# Patient Record
Sex: MALE | Race: WHITE | NOT HISPANIC OR LATINO | Employment: FULL TIME | URBAN - METROPOLITAN AREA
[De-identification: names, ages, dates, MRNs, and addresses within clinical notes are randomized per-mention and may not be internally consistent; named-entity substitution may affect disease eponyms.]

---

## 2017-01-03 ENCOUNTER — TELEPHONE (OUTPATIENT)
Dept: ORTHOPEDICS | Facility: CLINIC | Age: 22
End: 2017-01-03

## 2017-01-03 NOTE — TELEPHONE ENCOUNTER
INFORMED PT'S MOTHER THAT MEDICAID IS NOT ACCEPTED BY [facility].  PT HAS NOT BEEN SEEN AT [facility] SINCE 2013.

## 2017-01-03 NOTE — TELEPHONE ENCOUNTER
----- Message from Cary Gao sent at 1/3/2017  3:52 PM CST -----  Contact: pt mother christine  Pt has medicaid and pt mother wants to see if you will see him. Pt was seen in urgent care in [place] office. Pt fracture his knee. Pt mom can be reach at [phone].I have explain that medicaid panel was closed and she wanted to still insist on her son getting an appt thanks.

## 2020-01-07 ENCOUNTER — NURSE TRIAGE (OUTPATIENT)
Dept: ADMINISTRATIVE | Facility: CLINIC | Age: 25
End: 2020-01-07

## 2020-01-07 NOTE — TELEPHONE ENCOUNTER
Pt called to set up an appointment for a new PCP that he has not seen yet but has numbness to left arm while at rest. Symptoms come and go. Pt denies pain. Symptoms started last two fridays ago after working out. Pt ws bench pressing. Advised patient to see a provider within 4 hours and call during office hours to set up an appt with his new PCP.     Reason for Disposition   [1] Numbness (i.e., loss of sensation) of the face, arm / hand, or leg / foot on one side of the body AND [2] gradual onset (e.g., days to weeks) AND [3] present now    Additional Information   Negative: [1] SEVERE weakness (i.e., unable to walk or barely able to walk, requires support) AND [2] new onset or worsening   Negative: [1] Weakness (i.e., paralysis, loss of muscle strength) of the face, arm / hand, or leg / foot on one side of the body AND [2] sudden onset AND [3] present now   Negative: [1] Numbness (i.e., loss of sensation) of the face, arm / hand, or leg / foot on one side of the body AND [2] sudden onset AND [3] present now   Negative: [1] Loss of speech or garbled speech AND [2] sudden onset AND [3] present now   Negative: Difficult to awaken or acting confused (e.g., disoriented, slurred speech)   Negative: Sounds like a life-threatening emergency to the triager   Negative: Headache  (and neurologic deficit)   Negative: [1] Back pain AND [2] numbness (loss of sensation) in groin or rectal area   Negative: [1] Unable to urinate (or only a few drops) > 4 hours AND [2] bladder feels very full (e.g., palpable bladder or strong urge to urinate)   Negative: [1] Loss of control of bowel or bladder (i.e., incontinence) AND [2] new onset   Negative: [1] Weakness (i.e., paralysis, loss of muscle strength) of the face, arm / hand, or leg / foot on one side of the body AND [2] sudden onset AND [3] brief (now gone)   Negative: [1] Numbness (i.e., loss of sensation) of the face, arm / hand, or leg / foot on one side of the body  AND [2] sudden onset AND [3] brief (now gone)   Negative: [1] Loss of speech or garbled speech AND [2] sudden onset AND [3] brief (now gone)   Negative: Bell's palsy suspected (i.e., weakness on only one side of the face, developing over hours to days, no other symptoms)   Negative: Patient sounds very sick or weak to the triager   Negative: Neck pain  (and neurologic deficit)   Negative: Back pain  (and neurologic deficit)   Negative: [1] Weakness of the face, arm / hand, or leg / foot on one side of the body AND [2] gradual onset (e.g., days to weeks) AND [3] present now    Protocols used: NEUROLOGIC DEFICIT-A-AH

## 2020-01-09 ENCOUNTER — OFFICE VISIT (OUTPATIENT)
Dept: INTERNAL MEDICINE | Facility: CLINIC | Age: 25
End: 2020-01-09
Payer: COMMERCIAL

## 2020-01-09 VITALS
HEIGHT: 67 IN | DIASTOLIC BLOOD PRESSURE: 86 MMHG | WEIGHT: 195.13 LBS | BODY MASS INDEX: 30.63 KG/M2 | HEART RATE: 91 BPM | OXYGEN SATURATION: 99 % | SYSTOLIC BLOOD PRESSURE: 128 MMHG

## 2020-01-09 DIAGNOSIS — M54.2 NECK PAIN: ICD-10-CM

## 2020-01-09 DIAGNOSIS — M25.512 LEFT SHOULDER PAIN, UNSPECIFIED CHRONICITY: Primary | ICD-10-CM

## 2020-01-09 PROCEDURE — 99999 PR PBB SHADOW E&M-EST. PATIENT-LVL IV: ICD-10-PCS | Mod: PBBFAC,,, | Performed by: STUDENT IN AN ORGANIZED HEALTH CARE EDUCATION/TRAINING PROGRAM

## 2020-01-09 PROCEDURE — 99999 PR PBB SHADOW E&M-EST. PATIENT-LVL IV: CPT | Mod: PBBFAC,,, | Performed by: STUDENT IN AN ORGANIZED HEALTH CARE EDUCATION/TRAINING PROGRAM

## 2020-01-09 PROCEDURE — 99203 PR OFFICE/OUTPT VISIT, NEW, LEVL III, 30-44 MIN: ICD-10-PCS | Mod: S$GLB,,, | Performed by: STUDENT IN AN ORGANIZED HEALTH CARE EDUCATION/TRAINING PROGRAM

## 2020-01-09 PROCEDURE — 99203 OFFICE O/P NEW LOW 30 MIN: CPT | Mod: S$GLB,,, | Performed by: STUDENT IN AN ORGANIZED HEALTH CARE EDUCATION/TRAINING PROGRAM

## 2020-01-09 RX ORDER — IBUPROFEN 800 MG/1
800 TABLET ORAL 3 TIMES DAILY
Qty: 21 TABLET | Refills: 0 | Status: SHIPPED | OUTPATIENT
Start: 2020-01-09 | End: 2020-01-16

## 2020-01-09 NOTE — PROGRESS NOTES
Subjective:       Patient ID: Ian Quiroga is a 24 y.o. male.    Chief Complaint: Shoulder Pain (left)    Ian Quiroga is a 23 y/o male with hx of anxiety presents to urgent care for shoulder pain.    Pt report working at the gym and pushed 295 Ib and felt pain in the left shoulder with numbness and tingling in his arm and both sides hand mainly 3 left fingers. He losses sensation when he left arm up - he reports having some neck pain too.    Started - 2 weeks ago  Injury- working in gym 295ib press   Hx- trauma in AC 11 years ago,  Pain: left shoulder and arm and tingling on his left arm and finger       Shoulder Pain    The pain is present in the left fingers, left hand, left arm, left elbow, left wrist and left shoulder. This is a new problem. The current episode started 1 to 4 weeks ago. There has been a history of trauma. The problem occurs constantly. The problem has been gradually worsening. The quality of the pain is described as sharp. Associated symptoms include an inability to bear weight, numbness and tingling. Pertinent negatives include no fever or headaches. The symptoms are aggravated by activity. He has tried cold for the symptoms.          Review of Systems   Constitutional: Positive for activity change. Negative for appetite change, chills and fever.   HENT: Negative for congestion.    Eyes: Negative for pain and itching.   Respiratory: Negative for cough, chest tightness and shortness of breath.    Cardiovascular: Negative for palpitations and leg swelling.   Gastrointestinal: Negative for abdominal pain and nausea.   Endocrine: Negative for polyphagia and polyuria.   Genitourinary: Negative for dysuria and frequency.   Musculoskeletal: Negative for back pain.   Neurological: Positive for tingling and numbness. Negative for headaches.   Psychiatric/Behavioral: Negative for agitation and behavioral problems.       Objective:      Physical Exam   Constitutional: He is oriented to person,  place, and time. He appears well-developed.   HENT:   Head: Normocephalic.   Cardiovascular: Normal rate, regular rhythm and normal heart sounds.   No murmur heard.  Pulmonary/Chest: Effort normal and breath sounds normal.   Abdominal: Soft. Bowel sounds are normal. He exhibits no distension. There is no tenderness.   Musculoskeletal: He exhibits tenderness (left shoulder / deltoid muscle). He exhibits no deformity.   Sensation intact- able to move arm but with pain   Neurological: He is alert and oriented to person, place, and time.   Psychiatric: He has a normal mood and affect. His behavior is normal.       Assessment:       1. Left shoulder pain, unspecified chronicity        Plan:           - Given his numbness in ulner and radial distrubution, suspect nerve injury, will do MRI neck and left shoulder to r/o disc prolapse vs brachial plexus injury.    - Orthopedic referral for Left shoulder pain.            Betsy Lance MD  Internal Medicine  PGY-3          Betsy Lance MD  Internal Medicine  PGY-3

## 2020-01-13 ENCOUNTER — HOSPITAL ENCOUNTER (OUTPATIENT)
Dept: RADIOLOGY | Facility: HOSPITAL | Age: 25
Discharge: HOME | End: 2020-01-13
Attending: STUDENT IN AN ORGANIZED HEALTH CARE EDUCATION/TRAINING PROGRAM
Payer: COMMERCIAL

## 2020-01-13 DIAGNOSIS — M25.512 LEFT SHOULDER PAIN, UNSPECIFIED CHRONICITY: ICD-10-CM

## 2020-01-13 DIAGNOSIS — M54.2 NECK PAIN: ICD-10-CM

## 2020-01-13 PROCEDURE — 72141 MRI NECK SPINE W/O DYE: CPT | Mod: 26,,, | Performed by: RADIOLOGY

## 2020-01-13 PROCEDURE — 73221 MRI SHOULDER WITHOUT CONTRAST LEFT: ICD-10-PCS | Mod: 26,LT,, | Performed by: RADIOLOGY

## 2020-01-13 PROCEDURE — 72141 MRI NECK SPINE W/O DYE: CPT | Mod: TC

## 2020-01-13 PROCEDURE — 73221 MRI JOINT UPR EXTREM W/O DYE: CPT | Mod: TC,LT

## 2020-01-13 PROCEDURE — 73221 MRI JOINT UPR EXTREM W/O DYE: CPT | Mod: 26,LT,, | Performed by: RADIOLOGY

## 2020-01-13 PROCEDURE — 72141 MRI CERVICAL SPINE WITHOUT CONTRAST: ICD-10-PCS | Mod: 26,,, | Performed by: RADIOLOGY

## 2020-01-13 NOTE — PROGRESS NOTES
Teaching Statement  I agree with the resident's history, exam and assessment and plan as stated above.  Patient meets criteria for primary care exemption.  Vic Dc MD

## 2020-01-16 ENCOUNTER — OFFICE VISIT (OUTPATIENT)
Dept: ORTHOPEDICS | Facility: CLINIC | Age: 25
End: 2020-01-16
Payer: COMMERCIAL

## 2020-01-16 ENCOUNTER — HOSPITAL ENCOUNTER (OUTPATIENT)
Dept: RADIOLOGY | Facility: HOSPITAL | Age: 25
Discharge: HOME | End: 2020-01-16
Attending: PHYSICIAN ASSISTANT
Payer: COMMERCIAL

## 2020-01-16 VITALS — WEIGHT: 194.88 LBS | HEIGHT: 67 IN | BODY MASS INDEX: 30.59 KG/M2

## 2020-01-16 DIAGNOSIS — M75.82 TENDINITIS OF LEFT ROTATOR CUFF: Primary | ICD-10-CM

## 2020-01-16 DIAGNOSIS — M25.512 LEFT SHOULDER PAIN, UNSPECIFIED CHRONICITY: ICD-10-CM

## 2020-01-16 DIAGNOSIS — M75.22 BICEPS TENDINITIS OF LEFT UPPER EXTREMITY: ICD-10-CM

## 2020-01-16 PROCEDURE — 99204 PR OFFICE/OUTPT VISIT, NEW, LEVL IV, 45-59 MIN: ICD-10-PCS | Mod: 25,S$GLB,, | Performed by: PHYSICIAN ASSISTANT

## 2020-01-16 PROCEDURE — 3008F PR BODY MASS INDEX (BMI) DOCUMENTED: ICD-10-PCS | Mod: CPTII,S$GLB,, | Performed by: PHYSICIAN ASSISTANT

## 2020-01-16 PROCEDURE — 99999 PR PBB SHADOW E&M-EST. PATIENT-LVL III: CPT | Mod: PBBFAC,,, | Performed by: PHYSICIAN ASSISTANT

## 2020-01-16 PROCEDURE — 99999 PR PBB SHADOW E&M-EST. PATIENT-LVL III: ICD-10-PCS | Mod: PBBFAC,,, | Performed by: PHYSICIAN ASSISTANT

## 2020-01-16 PROCEDURE — 73030 X-RAY EXAM OF SHOULDER: CPT | Mod: TC,LT

## 2020-01-16 PROCEDURE — 73030 XR SHOULDER COMPLETE 2 OR MORE VIEWS LEFT: ICD-10-PCS | Mod: 26,LT,, | Performed by: RADIOLOGY

## 2020-01-16 PROCEDURE — 20610 PR DRAIN/INJECT LARGE JOINT/BURSA: ICD-10-PCS | Mod: LT,S$GLB,, | Performed by: PHYSICIAN ASSISTANT

## 2020-01-16 PROCEDURE — 73030 X-RAY EXAM OF SHOULDER: CPT | Mod: 26,LT,, | Performed by: RADIOLOGY

## 2020-01-16 PROCEDURE — 3008F BODY MASS INDEX DOCD: CPT | Mod: CPTII,S$GLB,, | Performed by: PHYSICIAN ASSISTANT

## 2020-01-16 PROCEDURE — 99204 OFFICE O/P NEW MOD 45 MIN: CPT | Mod: 25,S$GLB,, | Performed by: PHYSICIAN ASSISTANT

## 2020-01-16 PROCEDURE — 20610 DRAIN/INJ JOINT/BURSA W/O US: CPT | Mod: LT,S$GLB,, | Performed by: PHYSICIAN ASSISTANT

## 2020-01-16 RX ORDER — MELOXICAM 15 MG/1
15 TABLET ORAL DAILY
Qty: 30 TABLET | Refills: 0 | Status: SHIPPED | OUTPATIENT
Start: 2020-01-16 | End: 2020-02-15

## 2020-01-16 RX ORDER — METHYLPREDNISOLONE ACETATE 80 MG/ML
80 INJECTION, SUSPENSION INTRA-ARTICULAR; INTRALESIONAL; INTRAMUSCULAR; SOFT TISSUE
Status: COMPLETED | OUTPATIENT
Start: 2020-01-16 | End: 2020-01-16

## 2020-01-16 RX ADMIN — METHYLPREDNISOLONE ACETATE 80 MG: 80 INJECTION, SUSPENSION INTRA-ARTICULAR; INTRALESIONAL; INTRAMUSCULAR; SOFT TISSUE at 09:01

## 2020-01-16 NOTE — PROGRESS NOTES
Subjective:      Patient ID: Ian Quiroga is a 25 y.o. male.    Chief Complaint: Pain of the Left Shoulder    HPI  25 year old male presents with chief complaint of left shoulder pain x 3 weeks. He is RHD. Pain began after he was lifting weights. He reports h/o left AC tear in 2008. No previous shoulder surgery. Pain is worse with activity, moving it. He also has pain with typing. He took ibuprofen for 1 day with no relief. No PT or injection has been done. He is a .   Review of Systems   Constitution: Negative for chills, fever and night sweats.   Cardiovascular: Negative for chest pain.   Respiratory: Negative for cough and shortness of breath.    Hematologic/Lymphatic: Does not bruise/bleed easily.   Skin: Negative for color change.   Gastrointestinal: Negative for heartburn.   Genitourinary: Negative for dysuria.   Neurological: Negative for numbness and paresthesias.   Psychiatric/Behavioral: Negative for altered mental status.   Allergic/Immunologic: Negative for persistent infections.         Objective:            General    Vitals reviewed.  Constitutional: He is oriented to person, place, and time. He appears well-developed and well-nourished.   HENT:   Head: Atraumatic.   Neck: Neck supple.   Cardiovascular: Normal rate.    Pulmonary/Chest: Effort normal.   Neurological: He is alert and oriented to person, place, and time.   Psychiatric: He has a normal mood and affect.             Left Shoulder Exam     Range of Motion   Active abduction: normal   Forward Flexion: normal   External Rotation 0 degrees: normal   Internal rotation 0 degrees: normal     Tests & Signs   Melgoza test: positive  Impingement: positive  Speed's Test: positive    Other   Sensation: normal     Comments:  Pain with shoulder ROM. Mild pain with empty can test.       Muscle Strength   Left Upper Extremity  Supraspinatus: 5/5/5   Biceps: 5/5/5     Vascular Exam       Left Pulses      Radial:                     2+          X-ray: ordered and reviewed by myself. No significant abnormality seen.    MRI: Mild tendinosis of the long head of the biceps tendon and supraspinatus.        Assessment:       Encounter Diagnoses   Name Primary?    Tendinitis of left rotator cuff Yes    Biceps tendinitis of left upper extremity           Plan:       Discussed treatment options with patient. He will take mobic x 2 weeks then prn. He would like an injection. Order placed for PT. If pain does not improve, will refer him to sports medicine. RTC prn.     PROCEDURE:  I have explained the risks, benefits, and alternatives of the procedure in detail.  The patient voices understanding and all questions have been answered.  The patient agrees to proceed as planned. So after I performed a sterile prep of the skin in the normal fashion the left shoulder is injected from the posterior approach using a 22 gauge needle with a combination of 4cc 1% plain lidocaine and 80 mg of depo medrol. The patient is cautioned and immediate relief of pain is secondary to the local anesthetic and will be temporary.  After the anesthetic wears off there may be a increase in pain that may last for a few hours or a few days and they should use ice to help alleviate this flair up of pain.

## 2020-01-16 NOTE — LETTER
January 16, 2020      Betsy Lance MD  [address]  [facility] [address]           [facility]  [address]  [facility] [address]  Phone: [phone]          Patient: Ian Quiroga   MR Number: [id number]   YOB: 1995   Date of Visit: 1/16/2020       Dear Dr. Betsy Lance:    Thank you for referring Ian Quiroga to me for evaluation. Attached you will find relevant portions of my assessment and plan of care.    If you have questions, please do not hesitate to call me. I look forward to following Ian Quiroga along with you.    Sincerely,    LAWRENCE Smithosure  CC:  No Recipients    If you would like to receive this communication electronically, please contact [email or web address] or [phone] to request more information on [employer] access.    For providers and/or their staff who would like to refer a patient to [facility], please contact us through our one-stop-shop provider referral line, [facility], at [phone].    If you feel you have received this communication in error or would no longer like to receive these types of communications, please e-mail [email or web address]

## 2020-01-27 ENCOUNTER — CLINICAL SUPPORT (OUTPATIENT)
Dept: REHABILITATION | Facility: HOSPITAL | Age: 25
End: 2020-01-27
Attending: PHYSICIAN ASSISTANT
Payer: COMMERCIAL

## 2020-01-27 DIAGNOSIS — R68.89 DECREASED ACTIVITY TOLERANCE: ICD-10-CM

## 2020-01-27 DIAGNOSIS — M25.612 DECREASED ROM OF LEFT SHOULDER: ICD-10-CM

## 2020-01-27 DIAGNOSIS — R29.898 DECREASED STRENGTH OF UPPER EXTREMITY: ICD-10-CM

## 2020-01-27 PROCEDURE — 97161 PT EVAL LOW COMPLEX 20 MIN: CPT | Mod: PN

## 2020-01-27 PROCEDURE — 97110 THERAPEUTIC EXERCISES: CPT | Mod: PN

## 2020-01-27 NOTE — PLAN OF CARE
[facility]  Physical Therapy Initial Evaluation    Name: Ian Quiroga  Clinic Number: [id number]    Therapy Diagnosis:   Encounter Diagnoses   Name Primary?    Decreased ROM of left shoulder     Decreased strength of upper extremity     Decreased activity tolerance      Physician: Maddison Khan PA-C    Physician Orders: PT Eval and Treat  Medical Diagnosis from Referral: M75.82 (ICD-10-CM) - Tendinitis of left rotator cuff  M75.22 (ICD-10-CM) - Biceps tendinitis of left upper extremity  Evaluation Date: 1/27/2020  Authorization Period Expiration: 12/31/20  Plan of Care Expiration: 3/13/2020  Visit # / Visits authorized: 1/20 ($35 co-pay)    Time In: 8:17 AM (pt arrived late)  Time Out: 9:00 AM  Total Billable Time: 43 minutes    Precautions: Depression with Anxiety, Smoker    Subjective   Date of onset: 1 month ago  History of current condition - Ian reports: he had cortisone shot about 1 week ago and has been feeling a lot better since then. Before the injection, he was unable to lift his L arm up without it going numb and would get tingling sensations down into his thumb and left hand. About 1 month ago, he was performing bench press at the gym and his shoulder immediately gave out on him. Since the injury he has not been doing any type of upper body weight lifting. Before the injection, he would use his arm a good bit and then it would start to hurt. He has not been using any type of ice or other modalities. He has not been taking any type of medication either. He has not had any difficulty performing his work duties such as typing. No other medical conditions to be concerned with at this time.     Medical History:   Past Medical History:   Diagnosis Date    Depression with anxiety        Surgical History:   Ian Quiroga  has a past surgical history that includes Hernia repair (2018).    Medications:   Ian has a current medication list which includes the following  prescription(s): meloxicam and mirtazapine.    Allergies:   Review of patient's allergies indicates:   Allergen Reactions    No known drug allergies         Imaging = Xray: No significant abnormality seen.    Electronically signed by: Nithin Hickey MD  Date: 01/16/2020  Time: 09:08    MRI: Mild tendinosis of the long head of the biceps tendon and supraspinatus.    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  Prior Level of Function: able to perform all ADL's  Current Level of Function: able to perform all ADL's, unable to weight lift at the gym    Pain:  Current 0/10, worst 5/10, best 0/10   Location: left shoulder   Description: stabbing  Aggravating Factors: Lifting and carrying items, did have trouble sleeping but not since the injection  Easing Factors: relaxation and injection    Pts goals: increase strength, return to weight lifting    Objective     Observation/Posture: sitting without rounded shoulders    Passive Range of Motion:   Shoulder Left Right   Flexion 165* 180   Abduction 180 180   ER at 0 65 65   ER at 90 85 85   IR 70 70      Active Range of Motion:   Shoulder Left Right   Flexion 160* 180   Abduction 180 180   ER at 0 65 65   ER at 90 80 80   IR 70 70     Upper Extremity Strength   (L) UE (R) UE   Shoulder flexion: 5/5 * 5/5   Shoulder Abduction: 5/5 * 5/5   Shoulder ER 4+/5 4+/5   Elbow Flexion 5/5 5/5   Elbow Extension 5/5 5/5   Wrist Flexion 5/5 5/5   Wrist Extension 5/5 5/5   Shoulder IR 5/5 5/5   Lower Trap 4+/5 4+/5   Middle Trap 4+/5 4+/5   Rhomboids 4+/5 4+/5       Special Tests:  AC Joint Left Right   Empty Can Test - -   Drop Arm test - -   Subscaputlaris Lift Off T5 T5   O'esther's test - -   Mecca's Kenndy - -   Neer's  Test - -   Speed's test - -     Joint Mobility: WFL, no deficits    Palpation: TTP along the distal aspect of the biceps tendon on the L, patient denied pain when palpating superior biceps tendon but facial expression showed signs of pain.    Sensation: intact B to light touch    Flexibility: WFL, Cervical flexibility was within a functional ROM.    CMS Impairment/Limitation/Restriction for FOTO Shoulder Survey    Therapist reviewed FOTO scores for Ian Quiroga on 1/27/2020.   FOTO documents entered into [employer] - see Media section.    Limitation Score: - please see the patient's chart for his most recent score. Pt arrived late to his appointment and was emailed the FOTO survey post treatment.       TREATMENT   Treatment Time In: 8:45 AM  Treatment Time Out: 8:55 AM   Total Treatment time separate from Evaluation: 10 minutes    Ian received therapeutic exercises to develop strength for 10 minutes including:    Seated Scapular Retractions x10 reps    Pt was heavily educated on the importance of rest and recovery and not over doing things in the gym.       Home Exercises and Patient Education Provided    Education provided:   - HEP Administration and Review  - Anatomy/Physiology of the Shoulder and surrounding musculature  - post exercise soreness  - proper mechanics with weight lifting    Written Home Exercises Provided: No - Education was provided regarding proper technique with different weightlifting exercises and Ian demonstrated appropriate understanding in that regard. Pt was instructed on how to perform seated scapular retractions.    Exercises were reviewed and Ian was able to demonstrate them prior to the end of the session.  Ian demonstrated good  understanding of the education provided.     See EMR under Patient Instructions for exercises provided on initial evaluation (1/27/20).    Assessment   Ian is a 25 y.o. male referred to outpatient Physical Therapy with a medical diagnosis of Tendinitis  of left rotator cuff and Biceps tendinitis of left upper extremity. Pt presents with appropriate UE strength with mild weakness with B external rotation. Pt did not report any pain when testing ROM, but there were facial expression that coincided with increased pain. Pt denied pain when performing empty can test on the L, but facial expression demonstrated increased tenderness. Upon palpation, noted the patient to have increased tenderness along the distal aspect of the biceps tendon. Pt was heavily educated on the importance of not over doing it both at work and in the gym. PT advised the patient to avoid bench press at this time as well as any activity that aggravates his shoulder to continue to allow healing. Pt was not willing to continue therapy at this time but was agreeable to return in 2 weeks for follow up appointment.     Pt prognosis is Good.   Pt will benefit from skilled outpatient Physical Therapy to address the deficits stated above and in the chart below, provide pt/family education, and to maximize pt's level of independence.     Plan of care discussed with patient: Yes  Pt's spiritual, cultural and educational needs considered and patient is agreeable to the plan of care and goals as stated below:     Anticipated Barriers for therapy: None    Medical Necessity is demonstrated by the following  History  Co-morbidities and personal factors that may impact the plan of care Co-morbidities:   Depression with Anxiety, Smoker    Personal Factors:   age  coping style  character  attitudes     high   Examination  Body Structures and Functions, activity limitations and participation restrictions that may impact the plan of care Body Regions:   upper extremities    Body Systems:    gross symmetry  ROM  strength    Participation Restrictions:   Unable to participate in weight lifting activities at the gym    Activity limitations:   Learning and applying knowledge  no deficits    General Tasks and Commands  no  deficits    Communication  no deficits    Mobility  lifting and carrying objects    Self care  no deficits    Domestic Life  no deficits    Interactions/Relationships  no deficits    Life Areas  no deficits    Community and Social Life  community life  recreation and leisure         moderate   Clinical Presentation stable and uncomplicated low   Decision Making/ Complexity Score: low     Goals:  Short Term Goals: 3 weeks   - Pt will demonstrate increased L shoulder flexion ROM by 5 degrees for improved functional mobility overall  - Pt will demonstrate increased L shoulder rhomboid strength by at least 1/2 grade via MMT for improved scapular stability.  - Pt will be able to perform all work related tasks without provocation of pain for improved performance of work duties.    Long Term Goals: 6 weeks   - Pt will demonstrate increased L shoulder flexion ROM by 10 degrees for improved functional mobility overall  - Pt will demonstrate increased L shoulder rhomboid strength by at least 1 full grade via MMT for improved scapular stability.  - Pt will be able to perform all weight lifting activities without provocation of pain for return to leisure activities.    Plan   Plan of care Certification: 1/27/2020 to 3/13/2020.    Outpatient Physical Therapy 1 times weekly for 6 weeks to include the following interventions: Electrical Stimulation IFC/PREMOD/TENS, Gait Training, Manual Therapy, Moist Heat/ Ice, Neuromuscular Re-ed, Patient Education, Self Care, Therapeutic Activites, Therapeutic Exercise, Ultrasound and Dry Needling (by a certified therapist).     Kyra Barfield, PT, DPT

## 2020-03-16 ENCOUNTER — DOCUMENTATION ONLY (OUTPATIENT)
Dept: REHABILITATION | Facility: HOSPITAL | Age: 25
End: 2020-03-16

## 2020-03-16 PROBLEM — M25.612 DECREASED ROM OF LEFT SHOULDER: Status: RESOLVED | Noted: 2020-01-27 | Resolved: 2020-03-16

## 2020-03-16 PROBLEM — R29.898 DECREASED STRENGTH OF UPPER EXTREMITY: Status: RESOLVED | Noted: 2020-01-27 | Resolved: 2020-03-16

## 2020-03-16 PROBLEM — R68.89 DECREASED ACTIVITY TOLERANCE: Status: RESOLVED | Noted: 2020-01-27 | Resolved: 2020-03-16

## 2020-03-16 NOTE — PROGRESS NOTES
Outpatient Therapy Discharge Summary     Name: Ian Quiroga  Clinic Number: [id number]    Therapy Diagnosis:        Encounter Diagnoses   Name Primary?    Decreased ROM of left shoulder      Decreased strength of upper extremity      Decreased activity tolerance        Physician: Maddison Khan PA-C     Physician Orders: PT Eval and Treat  Medical Diagnosis from Referral: M75.82 (ICD-10-CM) - Tendinitis of left rotator cuff  M75.22 (ICD-10-CM) - Biceps tendinitis of left upper extremity  Evaluation Date: 1/27/2020  Authorization Period Expiration: 12/31/20  Plan of Care Expiration: 3/13/2020  Visit # / Visits authorized: 1/20 ($35 co-pay)     Time In: 8:17 AM (pt arrived late)  Time Out: 9:00 AM  Total Billable Time: 43 minutes    Date of Last visit: 1/27/2020  Total Visits Received: 1/1 (Eval only)  Cancelled Visits: 1  No Show Visits: 0    Assessment      This patient did not return to therapy after the initial evaluation on 1/27/2020. This patient is now discharged from skilled outpatient PT services.    Goals:  Short Term Goals: 3 weeks   - Pt will demonstrate increased L shoulder flexion ROM by 5 degrees for improved functional mobility overall. (not met - pt did not return to therapy)  - Pt will demonstrate increased L shoulder rhomboid strength by at least 1/2 grade via MMT for improved scapular stability. (not met - pt did not return to therapy)  - Pt will be able to perform all work related tasks without provocation of pain for improved performance of work duties. (not met - pt did not return to therapy)     Long Term Goals: 6 weeks   - Pt will demonstrate increased L shoulder flexion ROM by 10 degrees for improved functional mobility overall. (not met - pt did not return to therapy)  - Pt will demonstrate increased L shoulder rhomboid strength by at least 1 full grade via MMT for improved scapular stability. (not met - pt did not return to therapy)  - Pt will be able to perform all weight  lifting activities without provocation of pain for return to leisure activities. (not met - pt did not return to therapy)    Discharge reason: Patient has not attended therapy since 1/27/2020.    Plan   This patient is discharged from skilled outpatient Physical Therapy services.    Kyra Barfield, PT, DPT

## 2020-07-23 NOTE — PROGRESS NOTES
"INTERNAL MEDICINE CLINIC - SAME DAY APPOINTMENT  Progress Note    PRESENTING HISTORY     PCP: Betsy Lance MD  Chief Complaint/Reason for Visit:   No chief complaint on file.      History of Present Illness & ROS : Mr. Ian Quiroga is a 25 y.o. male.    Here for Same Day appt. Has not been seen by a Primary in 8 years.   New to this Provider and clinic. Reports that for the past 3 days, got tested twice for COVID at outside  centers and was told "negative". Headaches started 3 days ago, and with continued Nausea. Denies falls, trauma. Denies chronic history of headaches including "migraines".   Recently returned from family vacation to [place].   Has not tried taking anything for relief.   Denies any fevers.   Mild cough.   No congestion or ear pain.     Works out daily and "heading to the gym now".     Review of Systems:  Eyes: denies visual changes at this time denies floaters   ENT: no nasal congestion or sore throat  Respiratory: no cough or shorness of breath  Cardiovascular: no chest pain or palpitations  Gastrointestinal: no nausea or vomiting, no abdominal pain or change in bowel habits  Genitourinary: no hematuria or dysuria; denies frequency  Hematologic/Lymphatic: no easy bruising or lymphadenopathy  Musculoskeletal: no arthralgias or myalgias  Neurological: no seizures or tremors  Endocrine: no heat or cold intolerance      PAST HISTORY:     Past Medical History:   Diagnosis Date    Depression with anxiety        Past Surgical History:   Procedure Laterality Date    HERNIA REPAIR  2018       Family History   Problem Relation Age of Onset    Asthma Mother     Diabetes Maternal Grandmother        Social History     Socioeconomic History    Marital status: Single     Spouse name: Not on file    Number of children: Not on file    Years of education: Not on file    Highest education level: Not on file   Occupational History    Not on file   Social Needs    Financial resource strain: " Not on file    Food insecurity     Worry: Not on file     Inability: Not on file    Transportation needs     Medical: Not on file     Non-medical: Not on file   Tobacco Use    Smoking status: Current Every Day Smoker   Substance and Sexual Activity    Alcohol use: Yes     Alcohol/week: 0.0 standard drinks    Drug use: No    Sexual activity: Yes     Partners: Female   Lifestyle    Physical activity     Days per week: Not on file     Minutes per session: Not on file    Stress: Not on file   Relationships    Social connections     Talks on phone: Not on file     Gets together: Not on file     Attends Rastafarian service: Not on file     Active member of club or organization: Not on file     Attends meetings of clubs or organizations: Not on file     Relationship status: Not on file   Other Topics Concern    Not on file   Social History Narrative    Not on file       MEDICATIONS & ALLERGIES:     Current Outpatient Medications on File Prior to Visit   Medication Sig Dispense Refill    mirtazapine (REMERON) 30 MG tablet Every day       No current facility-administered medications on file prior to visit.         Review of patient's allergies indicates:   Allergen Reactions    No known drug allergies        Medications Reconciliation:   I have reconciled the patient's home medications with the patient/family. I have updated all changes.  Refer to After-Visit Medication List.    OBJECTIVE:     Vital Signs:  There were no vitals filed for this visit.  Wt Readings from Last 1 Encounters:   01/16/20 0845 88.4 kg (194 lb 14.2 oz)     There is no height or weight on file to calculate BMI.     Wt Readings from Last 3 Encounters:   07/24/20 83.4 kg (183 lb 13.8 oz)   01/16/20 88.4 kg (194 lb 14.2 oz)   01/09/20 88.5 kg (195 lb 1.7 oz)     Temp Readings from Last 3 Encounters:   03/30/12 98.2 °F (36.8 °C) (Oral)   01/05/12 98.5 °F (36.9 °C) (Oral)   12/05/11 98.4 °F (36.9 °C)     BP Readings from Last 3 Encounters:  "  07/24/20 126/84   01/09/20 128/86   01/05/12 97/73 (2 %, Z = -1.99 /  69 %, Z = 0.49)*     *BP percentiles are based on the 2017 AAP Clinical Practice Guideline for boys     Pulse Readings from Last 3 Encounters:   07/24/20 78   01/09/20 91   03/30/12 88       Physical Exam:  General: Well developed, well nourished. No distress.  HEENT: Head is normocephalic, atraumatic; ears are normal.   Eyes: Clear conjunctiva.  Neck: Supple, symmetrical neck; trachea midline. No LAD  Lungs: Clear to auscultation bilaterally and normal respiratory effort.  Cardiovascular: Heart with regular rate and rhythm. No murmurs, gallops or rubs  Extremities: No LE edema. Pulses 2+ and symmetric.   Abdomen: Abdomen is soft, "mild" verbal expression of "funny feeling" to RUQ upon palpation,  non-distended with normal bowel sounds.  Skin: Skin color, texture, turgor normal. No rashes.  Musculoskeletal: Normal gait.   Lymph Nodes: No cervical or supraclavicular adenopathy.  Neurologic: Normal strength and tone. No focal numbness or weakness.   Psychiatric: Not depressed.    *Clinical exam essentially unremarkable     Laboratory  Lab Results   Component Value Date    WBC 8.62 03/30/2012    HGB 14.0 03/30/2012    HCT 41.8 03/30/2012     03/30/2012    ALT 18 06/07/2010    AST 16 06/07/2010     06/07/2010    K 4.7 06/07/2010     06/07/2010    CREATININE 0.9 06/07/2010    BUN 9 06/07/2010    CO2 23 06/07/2010       ASSESSMENT & PLAN:     Here for Same Day appt.     Frequent headaches  Nausea  ? Migraines  ? Viral   ? GI / biliary   -     CBC auto differential; Future; Expected date: 07/24/2020  -     Comprehensive metabolic panel; Future; Expected date: 07/24/2020  -     Magnesium; Future; Expected date: 07/24/2020  -     LIZ-EGAN VIRUS VCA, IGG; Future; Expected date: 07/24/2020  -     Liz-Barr Virus (EBV) antibody panel; Future; Expected date: 07/24/2020  -     Ambulatory referral/consult to Optometry; Future; " Expected date: 07/31/2020  -     Amylase; Future; Expected date: 07/24/2020  -     Lipase; Future; Expected date: 07/24/2020    *If labs are negative are non revealing, will send for CT head.     Future Appointments   Date Time Provider Department Center   7/24/2020  9:45 AM LAB, [facility]  [facility]        Medication List          Accurate as of July 24, 2020  9:19 AM. If you have any questions, ask your nurse or doctor.            CONTINUE taking these medications    REMERON 30 MG tablet  Generic drug: mirtazapine          Signing Physician:  MARCO Aceves

## 2020-07-24 ENCOUNTER — OFFICE VISIT (OUTPATIENT)
Dept: INTERNAL MEDICINE | Facility: CLINIC | Age: 25
End: 2020-07-24
Payer: COMMERCIAL

## 2020-07-24 ENCOUNTER — LAB VISIT (OUTPATIENT)
Dept: LAB | Facility: HOSPITAL | Age: 25
End: 2020-07-24
Payer: COMMERCIAL

## 2020-07-24 ENCOUNTER — TELEPHONE (OUTPATIENT)
Dept: OPTOMETRY | Facility: CLINIC | Age: 25
End: 2020-07-24

## 2020-07-24 VITALS
HEIGHT: 67 IN | SYSTOLIC BLOOD PRESSURE: 126 MMHG | HEART RATE: 78 BPM | OXYGEN SATURATION: 99 % | DIASTOLIC BLOOD PRESSURE: 84 MMHG | BODY MASS INDEX: 28.86 KG/M2 | WEIGHT: 183.88 LBS

## 2020-07-24 DIAGNOSIS — R11.0 NAUSEA: ICD-10-CM

## 2020-07-24 DIAGNOSIS — R51.9 FREQUENT HEADACHES: Primary | ICD-10-CM

## 2020-07-24 DIAGNOSIS — R51.9 FREQUENT HEADACHES: ICD-10-CM

## 2020-07-24 LAB
ALBUMIN SERPL BCP-MCNC: 4.8 G/DL (ref 3.5–5.2)
ALP SERPL-CCNC: 59 U/L (ref 55–135)
ALT SERPL W/O P-5'-P-CCNC: 23 U/L (ref 10–44)
AMYLASE SERPL-CCNC: 42 U/L (ref 20–110)
ANION GAP SERPL CALC-SCNC: 6 MMOL/L (ref 8–16)
AST SERPL-CCNC: 21 U/L (ref 10–40)
BASOPHILS # BLD AUTO: 0.06 K/UL (ref 0–0.2)
BASOPHILS NFR BLD: 0.8 % (ref 0–1.9)
BILIRUB SERPL-MCNC: 0.5 MG/DL (ref 0.1–1)
BUN SERPL-MCNC: 15 MG/DL (ref 6–20)
CALCIUM SERPL-MCNC: 9.3 MG/DL (ref 8.7–10.5)
CHLORIDE SERPL-SCNC: 101 MMOL/L (ref 95–110)
CO2 SERPL-SCNC: 30 MMOL/L (ref 23–29)
CREAT SERPL-MCNC: 0.9 MG/DL (ref 0.5–1.4)
DIFFERENTIAL METHOD: NORMAL
EOSINOPHIL # BLD AUTO: 0.1 K/UL (ref 0–0.5)
EOSINOPHIL NFR BLD: 1.8 % (ref 0–8)
ERYTHROCYTE [DISTWIDTH] IN BLOOD BY AUTOMATED COUNT: 12.6 % (ref 11.5–14.5)
EST. GFR  (AFRICAN AMERICAN): >60 ML/MIN/1.73 M^2
EST. GFR  (NON AFRICAN AMERICAN): >60 ML/MIN/1.73 M^2
GLUCOSE SERPL-MCNC: 87 MG/DL (ref 70–110)
HCT VFR BLD AUTO: 46.9 % (ref 40–54)
HGB BLD-MCNC: 15.3 G/DL (ref 14–18)
IMM GRANULOCYTES # BLD AUTO: 0.02 K/UL (ref 0–0.04)
IMM GRANULOCYTES NFR BLD AUTO: 0.3 % (ref 0–0.5)
LIPASE SERPL-CCNC: 12 U/L (ref 4–60)
LYMPHOCYTES # BLD AUTO: 2.1 K/UL (ref 1–4.8)
LYMPHOCYTES NFR BLD: 29.2 % (ref 18–48)
MAGNESIUM SERPL-MCNC: 2.3 MG/DL (ref 1.6–2.6)
MCH RBC QN AUTO: 29.4 PG (ref 27–31)
MCHC RBC AUTO-ENTMCNC: 32.6 G/DL (ref 32–36)
MCV RBC AUTO: 90 FL (ref 82–98)
MONOCYTES # BLD AUTO: 0.7 K/UL (ref 0.3–1)
MONOCYTES NFR BLD: 9.6 % (ref 4–15)
NEUTROPHILS # BLD AUTO: 4.2 K/UL (ref 1.8–7.7)
NEUTROPHILS NFR BLD: 58.3 % (ref 38–73)
NRBC BLD-RTO: 0 /100 WBC
PLATELET # BLD AUTO: 235 K/UL (ref 150–350)
PMV BLD AUTO: 11.8 FL (ref 9.2–12.9)
POTASSIUM SERPL-SCNC: 4 MMOL/L (ref 3.5–5.1)
PROT SERPL-MCNC: 7.8 G/DL (ref 6–8.4)
RBC # BLD AUTO: 5.2 M/UL (ref 4.6–6.2)
SODIUM SERPL-SCNC: 137 MMOL/L (ref 136–145)
WBC # BLD AUTO: 7.19 K/UL (ref 3.9–12.7)

## 2020-07-24 PROCEDURE — 99214 OFFICE O/P EST MOD 30 MIN: CPT | Mod: S$GLB,,, | Performed by: NURSE PRACTITIONER

## 2020-07-24 PROCEDURE — 3008F PR BODY MASS INDEX (BMI) DOCUMENTED: ICD-10-PCS | Mod: CPTII,S$GLB,, | Performed by: NURSE PRACTITIONER

## 2020-07-24 PROCEDURE — 85025 COMPLETE CBC W/AUTO DIFF WBC: CPT

## 2020-07-24 PROCEDURE — 83735 ASSAY OF MAGNESIUM: CPT

## 2020-07-24 PROCEDURE — 82150 ASSAY OF AMYLASE: CPT

## 2020-07-24 PROCEDURE — 86665 EPSTEIN-BARR CAPSID VCA: CPT | Mod: 59

## 2020-07-24 PROCEDURE — 83690 ASSAY OF LIPASE: CPT

## 2020-07-24 PROCEDURE — 99214 PR OFFICE/OUTPT VISIT, EST, LEVL IV, 30-39 MIN: ICD-10-PCS | Mod: S$GLB,,, | Performed by: NURSE PRACTITIONER

## 2020-07-24 PROCEDURE — 3008F BODY MASS INDEX DOCD: CPT | Mod: CPTII,S$GLB,, | Performed by: NURSE PRACTITIONER

## 2020-07-24 PROCEDURE — 99999 PR PBB SHADOW E&M-EST. PATIENT-LVL IV: ICD-10-PCS | Mod: PBBFAC,,, | Performed by: NURSE PRACTITIONER

## 2020-07-24 PROCEDURE — 80053 COMPREHEN METABOLIC PANEL: CPT

## 2020-07-24 PROCEDURE — 99999 PR PBB SHADOW E&M-EST. PATIENT-LVL IV: CPT | Mod: PBBFAC,,, | Performed by: NURSE PRACTITIONER

## 2020-07-24 RX ORDER — ONDANSETRON 4 MG/1
4 TABLET, ORALLY DISINTEGRATING ORAL EVERY 6 HOURS PRN
Qty: 30 TABLET | Refills: 0 | Status: CANCELLED | OUTPATIENT
Start: 2020-07-24

## 2020-07-24 RX ORDER — BUTALBITAL, ACETAMINOPHEN AND CAFFEINE 50; 325; 40 MG/1; MG/1; MG/1
1 TABLET ORAL EVERY 4 HOURS PRN
Qty: 20 TABLET | Refills: 0 | Status: CANCELLED | OUTPATIENT
Start: 2020-07-24 | End: 2020-08-23

## 2020-07-27 ENCOUNTER — TELEPHONE (OUTPATIENT)
Dept: INTERNAL MEDICINE | Facility: CLINIC | Age: 25
End: 2020-07-27

## 2020-07-27 DIAGNOSIS — R42 DIZZINESS AND GIDDINESS: ICD-10-CM

## 2020-07-27 LAB
EBV EA IGG SER-ACNC: <5 U/ML
EBV NA IGG SER-ACNC: 8.9 U/ML
EBV VCA IGG SER QL IA: POSITIVE
EBV VCA IGG SER-ACNC: 66 U/ML
EBV VCA IGM SER-ACNC: <10 U/ML

## 2020-07-27 NOTE — TELEPHONE ENCOUNTER
----- Message from Jess Guadarrama sent at 7/27/2020  4:15 PM CDT -----  Regarding: pt advice  Pt calling in regards to possible cat espinoza     Pt can be reached at  [phone]

## 2020-07-29 ENCOUNTER — HOSPITAL ENCOUNTER (OUTPATIENT)
Dept: RADIOLOGY | Facility: HOSPITAL | Age: 25
Discharge: HOME | End: 2020-07-29
Attending: NURSE PRACTITIONER
Payer: COMMERCIAL

## 2020-07-29 DIAGNOSIS — R42 DIZZINESS AND GIDDINESS: ICD-10-CM

## 2020-07-29 PROCEDURE — 70450 CT HEAD/BRAIN W/O DYE: CPT | Mod: TC

## 2020-07-29 PROCEDURE — 70450 CT HEAD WITHOUT CONTRAST: ICD-10-PCS | Mod: 26,,, | Performed by: RADIOLOGY

## 2020-07-29 PROCEDURE — 70450 CT HEAD/BRAIN W/O DYE: CPT | Mod: 26,,, | Performed by: RADIOLOGY

## 2020-07-29 NOTE — PROGRESS NOTES
"ANNUAL VISIT NOTE     PRESENTING HISTORY     Reason for Visit:  Annual visit.    No chief complaint on file.      History of Present Illness & ROS: Mr. Ian Quiroga is a 25 y.o. male.  Here for Annual. Seen by provider for Headaches last week. Found to have Mucous retention cyst on CT of Head, which is the most likely source / cause for his chronic dizziness, in the setting of chronic sinusitis. Has not picked up the antibiotic yet. Scheduled to be seen in ENT on tomorrow.     Here today for an Annual.   Continues to work out daily. Taking OTC supplements to help with "body building".     He has a long standing history of "anxiety". States, "was a bad kid back in the day; but better and no longer taking any of these medications for more than 8 years".     He is a former athlete and "". Having some chronic right knee pain. Reportedly seen at an outside  clinic, addressed, but "never seen by Orthopedist".     Review of Systems:  Eyes: denies visual changes at this time denies floaters   ENT: no nasal congestion or sore throat  Respiratory: no cough or shorness of breath  Cardiovascular: no chest pain or palpitations  Gastrointestinal: no nausea or vomiting, no abdominal pain or change in bowel habits  Genitourinary: no hematuria or dysuria; denies frequency  Hematologic/Lymphatic: no easy bruising or lymphadenopathy  Musculoskeletal: no arthralgias or myalgias  Neurological: no seizures or tremors  Endocrine: no heat or cold intolerance      PAST HISTORY:     Past Medical History:   Diagnosis Date    Depression with anxiety        Past Surgical History:   Procedure Laterality Date    HERNIA REPAIR  2018       Family History   Problem Relation Age of Onset    Asthma Mother     Diabetes Maternal Grandmother        Social History     Socioeconomic History    Marital status: Single     Spouse name: Not on file    Number of children: Not on file    Years of education: Not on file    Highest " education level: Not on file   Occupational History    Not on file   Social Needs    Financial resource strain: Not on file    Food insecurity     Worry: Not on file     Inability: Not on file    Transportation needs     Medical: Not on file     Non-medical: Not on file   Tobacco Use    Smoking status: Current Every Day Smoker   Substance and Sexual Activity    Alcohol use: Yes     Alcohol/week: 0.0 standard drinks    Drug use: No    Sexual activity: Yes     Partners: Female   Lifestyle    Physical activity     Days per week: Not on file     Minutes per session: Not on file    Stress: Not on file   Relationships    Social connections     Talks on phone: Not on file     Gets together: Not on file     Attends Bahai service: Not on file     Active member of club or organization: Not on file     Attends meetings of clubs or organizations: Not on file     Relationship status: Not on file   Other Topics Concern    Not on file   Social History Narrative    Not on file       MEDICATIONS & ALLERGIES:     Current Outpatient Medications on File Prior to Visit   Medication Sig Dispense Refill    mirtazapine (REMERON) 30 MG tablet Every day       No current facility-administered medications on file prior to visit.         Review of patient's allergies indicates:   Allergen Reactions    No known drug allergies        Medications Reconciliation:   I have reconciled the patient's home medications and discharge medications with the patient/family. I have updated all changes.  Refer to After-Visit Medication List.    OBJECTIVE:     Vital Signs:  There were no vitals filed for this visit.  Wt Readings from Last 1 Encounters:   07/24/20 0849 83.4 kg (183 lb 13.8 oz)     There is no height or weight on file to calculate BMI.     Wt Readings from Last 3 Encounters:   07/30/20 87.2 kg (192 lb 3.9 oz)   07/24/20 83.4 kg (183 lb 13.8 oz)   01/16/20 88.4 kg (194 lb 14.2 oz)     Temp Readings from Last 3 Encounters:    03/30/12 98.2 °F (36.8 °C) (Oral)   01/05/12 98.5 °F (36.9 °C) (Oral)   12/05/11 98.4 °F (36.9 °C)     BP Readings from Last 3 Encounters:   07/30/20 122/88   07/24/20 126/84   01/09/20 128/86     Pulse Readings from Last 3 Encounters:   07/30/20 95   07/24/20 78   01/09/20 91       Physical Exam:  General: Well developed, well nourished. No distress.  HEENT: Head is normocephalic, atraumatic; ears are normal.   Eyes: Clear conjunctiva.  Neck: Supple, symmetrical neck; trachea midline.  Lungs: Clear to auscultation bilaterally and normal respiratory effort.  Cardiovascular: Heart with regular rate and rhythm. No murmurs, gallops or rubs  Extremities: No LE edema. Pulses 2+ and symmetric.   Abdomen: Abdomen is soft, non-tender non-distended with normal bowel sounds.  Skin: Skin color, texture, turgor normal. No rashes.  Musculoskeletal: Normal gait.   Lymph Nodes: No cervical or supraclavicular adenopathy.  Neurologic: Normal strength and tone. No focal numbness or weakness.   Psychiatric: Not depressed.      Laboratory:   Lab Results   Component Value Date    WBC 7.19 07/24/2020    HGB 15.3 07/24/2020    HCT 46.9 07/24/2020     07/24/2020    ALT 23 07/24/2020    AST 21 07/24/2020     07/24/2020    K 4.0 07/24/2020     07/24/2020    CREATININE 0.9 07/24/2020    BUN 15 07/24/2020    CO2 30 (H) 07/24/2020     CT Head:   There is no evidence for acute intracranial hemorrhage or sulcal effacement.  The ventricles are normal in size without hydrocephalus.  There is no midline shift or mass effect.  Mild moderate size lobular opacities within the maxillary antra and left sphenoid sinus concerning for mucous retention cyst.     Impression:  Unremarkable noncontrast CT head specifically without evidence for acute intracranial hemorrhage or sulcal effacement to suggest large territory recent infarction.     Patchy paranasal sinus opacities.     Clinical correlation and further evaluation as  warranted.       Electronically signed by: Anton River DO  Date:                                            07/29/2020  Time:                                           16:35      ASSESSMENT & PLAN:         Preventative health care  -     Comprehensive metabolic panel; Future; Expected date: 07/30/2020  -     CBC auto differential; Future; Expected date: 07/30/2020  -     Lipid Panel; Future; Expected date: 07/30/2020  -     Hemoglobin A1C; Future; Expected date: 07/30/2020  -     TSH; Future; Expected date: 07/30/2020  -     Vitamin D; Future; Expected date: 07/30/2020    Mucous retention cyst of maxillary sinus    Chronic pansinusitis  ` Referred to ENT (appt on tomorrow)  -     amoxicillin-clavulanate 875-125mg (AUGMENTIN) 875-125 mg per tablet; Take 1 tablet by mouth every 12 (twelve) hours. (Patient not taking: Reported on 7/30/2020)  Dispense: 14 tablet; Refill: 0      Chronic pain of right knee  -     Ambulatory referral/consult to Sports Medicine; Future; Expected date: 08/06/2020      JOELLE / Depression:   Controlled (Works out as an outlet)      Encounter for antibody response examination  -     COVID-19 (SARS CoV-2) IgG Antibody; Future; Expected date: 07/30/2020  *Advised that will need to follow up with Dr. Luis or another Internal Medicine Physician provider to be considered fully est'd in medical care with our practice site.     Future Appointments   Date Time Provider Department Center   7/30/2020  3:20 PM Yeimy Uriarte DO [facility] SPORTS [place]   7/31/2020 10:00 AM LAB, APPOINTMENT [facility] LAB [facility]   7/31/2020 11:30 AM Petr Gao MD [facility] ENT [place]   8/12/2020 11:00 AM Jeanmarie Nj, OD [facility] OPTOMTY [place]        Medication List          Accurate as of July 30, 2020  3:17 PM. If you have any questions, ask your nurse or doctor.            START taking these medications    amoxicillin-clavulanate 875-125mg 875-125 mg per tablet  Commonly known as: AUGMENTIN  Take  1 tablet by mouth every 12 (twelve) hours.  Started by: MARCO Aceves        STOP taking these medications    REMERON 30 MG tablet  Generic drug: mirtazapine  Stopped by: MARCO Aceves           Where to Get Your Medications      Information about where to get these medications is not yet available    Ask your nurse or doctor about these medications  · amoxicillin-clavulanate 875-125mg 875-125 mg per tablet       Signing Physician:  MARCO Aceves

## 2020-07-30 ENCOUNTER — OFFICE VISIT (OUTPATIENT)
Dept: INTERNAL MEDICINE | Facility: CLINIC | Age: 25
End: 2020-07-30
Payer: COMMERCIAL

## 2020-07-30 ENCOUNTER — OFFICE VISIT (OUTPATIENT)
Dept: SPORTS MEDICINE | Facility: CLINIC | Age: 25
End: 2020-07-30
Payer: COMMERCIAL

## 2020-07-30 ENCOUNTER — HOSPITAL ENCOUNTER (OUTPATIENT)
Dept: RADIOLOGY | Facility: HOSPITAL | Age: 25
Discharge: HOME | End: 2020-07-30
Attending: NEUROMUSCULOSKELETAL MEDICINE & OMM
Payer: COMMERCIAL

## 2020-07-30 ENCOUNTER — TELEPHONE (OUTPATIENT)
Dept: INTERNAL MEDICINE | Facility: CLINIC | Age: 25
End: 2020-07-30

## 2020-07-30 VITALS
WEIGHT: 191 LBS | HEART RATE: 89 BPM | DIASTOLIC BLOOD PRESSURE: 78 MMHG | SYSTOLIC BLOOD PRESSURE: 138 MMHG | HEIGHT: 67 IN | BODY MASS INDEX: 29.98 KG/M2

## 2020-07-30 VITALS
HEIGHT: 67 IN | OXYGEN SATURATION: 99 % | BODY MASS INDEX: 30.17 KG/M2 | HEART RATE: 95 BPM | DIASTOLIC BLOOD PRESSURE: 88 MMHG | SYSTOLIC BLOOD PRESSURE: 122 MMHG | WEIGHT: 192.25 LBS

## 2020-07-30 DIAGNOSIS — M22.2X1 PATELLOFEMORAL PAIN SYNDROME OF RIGHT KNEE: Primary | ICD-10-CM

## 2020-07-30 DIAGNOSIS — J32.4 CHRONIC PANSINUSITIS: ICD-10-CM

## 2020-07-30 DIAGNOSIS — G89.29 CHRONIC PAIN OF RIGHT KNEE: ICD-10-CM

## 2020-07-30 DIAGNOSIS — J32.0 CHRONIC MAXILLARY SINUSITIS: Primary | ICD-10-CM

## 2020-07-30 DIAGNOSIS — M21.41 PES PLANUS OF BOTH FEET: ICD-10-CM

## 2020-07-30 DIAGNOSIS — M25.561 CHRONIC PAIN OF RIGHT KNEE: ICD-10-CM

## 2020-07-30 DIAGNOSIS — M99.04 SACRAL REGION SOMATIC DYSFUNCTION: ICD-10-CM

## 2020-07-30 DIAGNOSIS — M99.03 SOMATIC DYSFUNCTION OF LUMBAR REGION: ICD-10-CM

## 2020-07-30 DIAGNOSIS — Z01.84 ENCOUNTER FOR ANTIBODY RESPONSE EXAMINATION: ICD-10-CM

## 2020-07-30 DIAGNOSIS — J34.1 MUCOUS RETENTION CYST OF MAXILLARY SINUS: ICD-10-CM

## 2020-07-30 DIAGNOSIS — Z00.00 PREVENTATIVE HEALTH CARE: Primary | ICD-10-CM

## 2020-07-30 DIAGNOSIS — M76.50 PATELLAR TENDINOSIS: ICD-10-CM

## 2020-07-30 DIAGNOSIS — M25.561 RIGHT KNEE PAIN, UNSPECIFIED CHRONICITY: ICD-10-CM

## 2020-07-30 DIAGNOSIS — M21.42 PES PLANUS OF BOTH FEET: ICD-10-CM

## 2020-07-30 DIAGNOSIS — M79.10 MYALGIA: ICD-10-CM

## 2020-07-30 DIAGNOSIS — M99.05 SOMATIC DYSFUNCTION OF PELVIC REGION: ICD-10-CM

## 2020-07-30 PROCEDURE — 99395 PREV VISIT EST AGE 18-39: CPT | Mod: S$GLB,,, | Performed by: NURSE PRACTITIONER

## 2020-07-30 PROCEDURE — 98926 OSTEOPATH MANJ 3-4 REGIONS: CPT | Mod: S$GLB,,, | Performed by: NEUROMUSCULOSKELETAL MEDICINE & OMM

## 2020-07-30 PROCEDURE — 73562 X-RAY EXAM OF KNEE 3: CPT | Mod: TC,LT

## 2020-07-30 PROCEDURE — 99999 PR PBB SHADOW E&M-EST. PATIENT-LVL IV: CPT | Mod: PBBFAC,,, | Performed by: NURSE PRACTITIONER

## 2020-07-30 PROCEDURE — 97110 PR THERAPEUTIC EXERCISES: ICD-10-PCS | Mod: S$GLB,,, | Performed by: NEUROMUSCULOSKELETAL MEDICINE & OMM

## 2020-07-30 PROCEDURE — 99395 PR PREVENTIVE VISIT,EST,18-39: ICD-10-PCS | Mod: S$GLB,,, | Performed by: NURSE PRACTITIONER

## 2020-07-30 PROCEDURE — 99204 PR OFFICE/OUTPT VISIT, NEW, LEVL IV, 45-59 MIN: ICD-10-PCS | Mod: 25,S$GLB,, | Performed by: NEUROMUSCULOSKELETAL MEDICINE & OMM

## 2020-07-30 PROCEDURE — 98926 PR OSTEOPATHIC MANIP,3-4 BODY REGN: ICD-10-PCS | Mod: S$GLB,,, | Performed by: NEUROMUSCULOSKELETAL MEDICINE & OMM

## 2020-07-30 PROCEDURE — 99999 PR PBB SHADOW E&M-EST. PATIENT-LVL III: ICD-10-PCS | Mod: PBBFAC,,, | Performed by: NEUROMUSCULOSKELETAL MEDICINE & OMM

## 2020-07-30 PROCEDURE — 99999 PR PBB SHADOW E&M-EST. PATIENT-LVL IV: ICD-10-PCS | Mod: PBBFAC,,, | Performed by: NURSE PRACTITIONER

## 2020-07-30 PROCEDURE — 3008F BODY MASS INDEX DOCD: CPT | Mod: CPTII,S$GLB,, | Performed by: NEUROMUSCULOSKELETAL MEDICINE & OMM

## 2020-07-30 PROCEDURE — 99999 PR PBB SHADOW E&M-EST. PATIENT-LVL III: CPT | Mod: PBBFAC,,, | Performed by: NEUROMUSCULOSKELETAL MEDICINE & OMM

## 2020-07-30 PROCEDURE — 99204 OFFICE O/P NEW MOD 45 MIN: CPT | Mod: 25,S$GLB,, | Performed by: NEUROMUSCULOSKELETAL MEDICINE & OMM

## 2020-07-30 PROCEDURE — 3008F PR BODY MASS INDEX (BMI) DOCUMENTED: ICD-10-PCS | Mod: CPTII,S$GLB,, | Performed by: NEUROMUSCULOSKELETAL MEDICINE & OMM

## 2020-07-30 PROCEDURE — 97110 THERAPEUTIC EXERCISES: CPT | Mod: S$GLB,,, | Performed by: NEUROMUSCULOSKELETAL MEDICINE & OMM

## 2020-07-30 RX ORDER — AMOXICILLIN AND CLAVULANATE POTASSIUM 875; 125 MG/1; MG/1
1 TABLET, FILM COATED ORAL EVERY 12 HOURS
Qty: 14 TABLET | Refills: 0
Start: 2020-07-30 | End: 2023-10-03

## 2020-07-30 RX ORDER — AMOXICILLIN AND CLAVULANATE POTASSIUM 875; 125 MG/1; MG/1
1 TABLET, FILM COATED ORAL EVERY 12 HOURS
Qty: 14 TABLET | Refills: 0 | Status: SHIPPED | OUTPATIENT
Start: 2020-07-30 | End: 2020-07-30

## 2020-07-30 NOTE — LETTER
July 30, 2020      Nelly Mitchell, FNP  [address]  [facility] [address]           [facility]  [address]  [facility] [address]  Phone: [phone]          Patient: Ian Quiroga   MR Number: [id number]   YOB: 1995   Date of Visit: 7/30/2020       Dear Nelly Mitchell:    Thank you for referring Ian Quiroga to me for evaluation. Attached you will find relevant portions of my assessment and plan of care.    If you have questions, please do not hesitate to call me. I look forward to following Ian Quiroga along with you.    Sincerely,    Yeimy Uriarte,     Enclosure  CC:  No Recipients    If you would like to receive this communication electronically, please contact [email or web address] or [phone] to request more information on [employer] Link access.    For providers and/or their staff who would like to refer a patient to [facility], please contact us through our one-stop-shop provider referral line, [facility], at [phone].    If you feel you have received this communication in error or would no longer like to receive these types of communications, please e-mail [email or web address]

## 2020-07-30 NOTE — TELEPHONE ENCOUNTER
CT Head with Mucous cyst in Maxillary and sphenoid region.   Will need to be referred to ENT and will place on course of Augmentin    SDJ

## 2020-07-30 NOTE — PROGRESS NOTES
"Subjective:     Ian PEREIRA McWhorter     Chief Complaint   Patient presents with    Right Knee - Pain       HPI    Ian is a 25 y.o. male coming in today for right knee pain that began about 9 year(s) ago, referred by MARCO Block. Pt. describes the pain as a 7/10 sharp pain that does not radiate. There was not a fall/injury/ or trauma associated with the onset of symptoms. Pt played sports in high school and used to do MMA. Now just lifting weights. The pain is better with rest and worse with standing after sitting, squatting. Pt notes he can't squat with his toes straight and turns his toes out. Pt reports seeing a doctor in 2011 who "freaked out and tried to put me in an immobilizer". He was diagnosed with Osgood-Schlatter's disease.  He decided not to seek care since that time as he didn't want to stop playing sports/going to the gym. Pt. Denies any other musculoskeletal complaints at this time.     Joint instability? Yes- pt states knee gives out on him and he has fallen at work  Mechanical locking/clicking? yes  Affecting ADL's? yes  Affecting sleep? no    Occupation: IT    Review of Systems   Constitutional: Negative for chills and fever.   HENT: Negative for hearing loss and tinnitus.    Eyes: Positive for blurred vision. Negative for photophobia.   Respiratory: Negative for cough and shortness of breath.    Cardiovascular: Negative for chest pain and leg swelling.   Gastrointestinal: Negative for abdominal pain, heartburn, nausea and vomiting.   Genitourinary: Negative for dysuria and hematuria.   Musculoskeletal: Positive for joint pain. Negative for back pain, falls, myalgias and neck pain.   Skin: Negative for rash.   Neurological: Positive for headaches. Negative for dizziness, tingling, focal weakness and weakness.   Endo/Heme/Allergies: Negative for environmental allergies. Does not bruise/bleed easily.   Psychiatric/Behavioral: Negative for depression. The patient is not nervous/anxious.  "       PAST MEDICAL HISTORY:   Past Medical History:   Diagnosis Date    Depression with anxiety     Mucous retention cyst of maxillary sinus      PAST SURGICAL HISTORY:   Past Surgical History:   Procedure Laterality Date    HERNIA REPAIR  2018     FAMILY HISTORY:   Family History   Problem Relation Age of Onset    Asthma Mother     Diabetes Maternal Grandmother     No Known Problems Father     Stroke Maternal Grandfather     No Known Problems Paternal Grandmother     No Known Problems Paternal Grandfather      SOCIAL HISTORY:   Social History     Socioeconomic History    Marital status: Single     Spouse name: Not on file    Number of children: Not on file    Years of education: Not on file    Highest education level: Not on file   Occupational History    Occupation: IT    Social Needs    Financial resource strain: Not on file    Food insecurity     Worry: Not on file     Inability: Not on file    Transportation needs     Medical: Not on file     Non-medical: Not on file   Tobacco Use    Smoking status: Former Smoker     Types: Cigarettes   Substance and Sexual Activity    Alcohol use: Not Currently     Alcohol/week: 0.0 standard drinks    Drug use: Yes     Types: Marijuana    Sexual activity: Yes     Partners: Female   Lifestyle    Physical activity     Days per week: Not on file     Minutes per session: Not on file    Stress: Not on file   Relationships    Social connections     Talks on phone: Not on file     Gets together: Not on file     Attends Confucianism service: Not on file     Active member of club or organization: Not on file     Attends meetings of clubs or organizations: Not on file     Relationship status: Not on file   Other Topics Concern    Not on file   Social History Narrative    Not on file       MEDICATIONS:   Current Outpatient Medications:     amoxicillin-clavulanate 875-125mg (AUGMENTIN) 875-125 mg per tablet, Take 1 tablet by mouth every 12 (twelve) hours. (Patient  "not taking: Reported on 7/30/2020), Disp: 14 tablet, Rfl: 0  ALLERGIES:   Review of patient's allergies indicates:   Allergen Reactions    No known drug allergies        Objective:     VITAL SIGNS: /78   Pulse 89   Ht 5' 7" (1.702 m)   Wt 86.6 kg (191 lb)   BMI 29.91 kg/m²    General    Nursing note and vitals reviewed.  Constitutional: He is oriented to person, place, and time. He appears well-developed and well-nourished.   HENT:   Head: Normocephalic and atraumatic.   no nasal discharge, no external ear redness or discharge   Eyes:   EOM is full and smooth  No eye redness or discharge   Neck: Neck supple. No tracheal deviation present.   Cardiovascular: Normal rate.    2+ Radial pulse bilaterally  2+ Dorsalis Pedis pulse bilaterally  No LE edema appreciated   Pulmonary/Chest: Effort normal. No respiratory distress.   Abdominal: He exhibits no distension.   No rigidity   Neurological: He is alert and oriented to person, place, and time. He exhibits normal muscle tone. Coordination normal.   See details below   Psychiatric: He has a normal mood and affect. His behavior is normal.               MUSCULOSKELETAL EXAM    right KNEE EXAMINATION   Affected side is compared to contralateral knee     Observation:  No edema, erythema, ecchymosis, or effusion noted.  No muscle atrophy of the thighs and calves noted.  No obvious bony deformities noted.   No Genu valgus/varum noted.  No recurvatum noted.    No tibial internal/external torsion.    Posture:  Posterior pelvis tilt with loss of lumbar lordosis  Gait: Non-antalgic with Neutral ankle mechanics and Pes planus     Tenderness:  Patella - + superior lateral patellar pole       Lateral joint line - none  Quad tendon - none   Medial joint line - none  Patellar tendon - + at distal attachment       Medial plica - none  Tibial tubercle - +   Lateral plica - none  Pes anserine - none   MCL prox - none  Distal ITB - none   MCL distal - none  MFC - none    LCL prox " - none  LFC - none    LCL distal - none  Tibia - none    Fibula - none    No obvious bursae, plicae, popliteal cysts, or tendon derangement palpated.          ROM (* = with pain):   Active extension to 0° on left without hyperextension, lag, crepitus, or patellar J sign.   Active extension to 0° on right without hyperextension, lag, crepitus, or patellar J sign.  Active flexion to 135° on left and 135° on right    Strength(* = with pain):  Knee Flexion - 5/5 on left and 5/5 on right  Knee Extension - 5/5 on left and 5/5 on right  Hip Flexion - 5/5 on left and 5/5 on right  Hip Extension - 5/5 on left and 5/5 on right  Ankle dorsiflexion - 5/5 on left and 5/5 on right  Ankle Plantarflexion - 5/5 on left and 5/5 on right  glutaeus medius - 4+/5 on left and 4+/5 on right with compensatory latissimus dorsi and TFL bilaterally    Patellofemoral Exam:   Patellar ballottement - negative  Bulge sign - negative  Patellar grind - negative    No patellar laxity with medial and lateral translation   No apprehension with medial and lateral patellar translation.     Meniscus Testing:     No pain with terminal extension and flexion at joint lines.  Eloises test - negative   Bounce home test - negative    Ligament Testing:  Lachman's test - negative  No laxity with anterior drawer.  No laxity with posterior drawer.    No posterior sag sign.   Prone dial testing - negative  No laxity with varus testing at 0 and 30 degrees.  No laxity with valgus testing at 0 and 30 degrees.    IT band testing:  Geremiass test - positive bilaterally  Grover Compression test - negative    Neurovascular Examination:   Normal gait without antalgia.  Sensation intact to light touch in the obturator, lateral/intermediate/medial/posterior femoral cutaneous, saphenous, and common peroneal nerves bilaterally.  Hamstring/gluteal firing pattern abnormal and asymmetric. Compensatory muscle firing pattern to contralateral upper thoracic and parascapular  musculature bilaterally with compensatory lumbar rotation on the right  Motor Function:    Fully intact motor function at hip, knee, foot and ankle.  DTRs: 2+/4 reflexes at L4 and S1 dermatomes. No clonus. Downgoing Babinski.   Negative seated straight leg raise bilaterally.    Pulses intact at the DP and PT arteries bilaterally.    Capillary refill intact <2 seconds in all toes bilaterally.    TART (Tissue texture abnormality, Asymmetry,  Restriction of motion and/or Tenderness) changes:     Lumbar Spine   L1 Neutral   L2 Neutral   L3 Neutral   L4 FRS RIGHT   L5 FRS RIGHT       Pelvis:  · Innominate:Right anterior rotation  · Pubic bone:Right inferior pubic shear    Sacrum:Left on Right sacral torsion      Key   F= Flexed   E = Extended   R = Rotated   S = Sidebent   TTA = tissue texture abnormality     IMAGIN. X-ray ordered due to right knee pain. (AP bilateral standing, PA bilateral standing in flexion, bilateral merchants, and  right lateral views) taken today.   2. X-ray images were reviewed personally by me and then directly with patient.  3. FINDINGS: No fracture, dislocation, bone destruction or OCD seen.  There is chronic calcific inferior patellar tendinosis.  No acute process seen.  4. IMPRESSION: Chronic calcific inferior patellar tendinosis      Assessment:      Encounter Diagnoses   Name Primary?    Patellofemoral pain syndrome of right knee Yes    Patellar tendinosis     Chronic pain of right knee     Pes planus of both feet     Myalgia     Sacral region somatic dysfunction     Somatic dysfunction of lumbar region     Somatic dysfunction of pelvic region           Plan:   1.Chronic right knee pain secondary to patellofemoral maltracking stemming from weak gluteal muscles and poor pelvic stability with compensatory muscle firing patterns.  Patient also has chronic calcific inferior patellar tendinosis which was noted on x-ray.  Underlying pes planus contributing to biomechanical  restrictions of the lower kinetic chain and increase knee strain.   - Recommend ice of 20 min at a time over-the-counter naproxen bid x 14 days then prn for pain control.  - HME order for right Thomas-pull light patellar brace fitted for today by Nuria LÓPEZ - wear with increased activity  - Recommend avoiding squats or lunge lifting activity until pain improves.  Also discussed with patient proper lifting form.  - OTC medial arch support recommended for bilateral pes planus.  Orange insert information given.  - OMT performed today to address biomechanical contributions to maltracking and HEP started  -  X-ray images of right knee taken today (AP bilateral standing, PA bilateral standing in flexion, bilateral merchants, and  right lateral views) showed chronic calcific inferior patellar tendinosis. Images were personally reviewed with patient.    2. OMT 3-4 regions. Oral consent obtained.  Reviewed benefits and potential side effects.   - OMT indicated today due to signs and symptoms as well as local and remote somatic dysfunction findings and their related neurokinetic, lymphatic, fascial and/or arteriovenous body connections.   - OMT techniques used: Myofascial Release and Muscle Energy   - Treatment was tolerated well. Improvement noted in segmental mobility post-treatment in dysfunctional regions. There were no adverse events and no complications immediately following treatment.     3. Pt. Given the following HEP:  A)  Pelvic clock exercises given to do from the 6-12 o'clock positions:10-15 reps, twice daily. Hand out of exercise also given.   B) Prone leg extension exercise: firing glut max, then extend leg straight 5 inches then lower leg back down, then relax fired glut max. Repeat 5-10 times on each side, BID  C) Clam shell exercises bilaterally: hold leg in abducted and externally rotated position for 5-10 seconds, repeat 5-10 times  D) IT band rolling: recommend using rolling stick or foam roller to  roll out IT band tension bilaterally, 1-2 times a day.     [id number] HOME EXERCISE PROGRAM (HEP):  The patient was taught a homegoing physical therapy regimen as described above. The patient demonstrated understanding of the exercises and proper technique of their execution. This interaction took 15 minutes.     4. Follow-up in 4 weeks for reevaluation    5. Patient agreeable to today's plan and all questions were answered    This note is dictated using the M*Modal Fluency Direct word recognition program. There are word recognition mistakes that are occasionally missed on review.

## 2020-07-31 ENCOUNTER — TELEPHONE (OUTPATIENT)
Dept: NEUROLOGY | Facility: CLINIC | Age: 25
End: 2020-07-31

## 2020-07-31 ENCOUNTER — OFFICE VISIT (OUTPATIENT)
Dept: OTOLARYNGOLOGY | Facility: CLINIC | Age: 25
End: 2020-07-31
Payer: COMMERCIAL

## 2020-07-31 ENCOUNTER — LAB VISIT (OUTPATIENT)
Dept: LAB | Facility: HOSPITAL | Age: 25
End: 2020-07-31
Payer: COMMERCIAL

## 2020-07-31 VITALS — DIASTOLIC BLOOD PRESSURE: 61 MMHG | HEART RATE: 72 BPM | SYSTOLIC BLOOD PRESSURE: 124 MMHG

## 2020-07-31 DIAGNOSIS — J34.3 HYPERTROPHY OF BOTH INFERIOR NASAL TURBINATES: ICD-10-CM

## 2020-07-31 DIAGNOSIS — G43.111 INTRACTABLE MIGRAINE WITH AURA WITH STATUS MIGRAINOSUS: Primary | ICD-10-CM

## 2020-07-31 DIAGNOSIS — Z01.84 ENCOUNTER FOR ANTIBODY RESPONSE EXAMINATION: ICD-10-CM

## 2020-07-31 DIAGNOSIS — J34.89 OTHER SPECIFIED DISORDERS OF NOSE AND NASAL SINUSES: ICD-10-CM

## 2020-07-31 DIAGNOSIS — J32.4 CHRONIC PANSINUSITIS: ICD-10-CM

## 2020-07-31 DIAGNOSIS — J34.89 CONCHA BULLOSA: ICD-10-CM

## 2020-07-31 DIAGNOSIS — J32.2 CHRONIC ETHMOIDAL SINUSITIS: ICD-10-CM

## 2020-07-31 DIAGNOSIS — J32.0 CHRONIC MAXILLARY SINUSITIS: ICD-10-CM

## 2020-07-31 DIAGNOSIS — R09.81 NASAL CONGESTION: ICD-10-CM

## 2020-07-31 DIAGNOSIS — Z00.00 PREVENTATIVE HEALTH CARE: ICD-10-CM

## 2020-07-31 DIAGNOSIS — J34.1 MUCOUS RETENTION CYST OF MAXILLARY SINUS: ICD-10-CM

## 2020-07-31 LAB
25(OH)D3+25(OH)D2 SERPL-MCNC: 26 NG/ML (ref 30–96)
ALBUMIN SERPL BCP-MCNC: 4.1 G/DL (ref 3.5–5.2)
ALP SERPL-CCNC: 58 U/L (ref 55–135)
ALT SERPL W/O P-5'-P-CCNC: 25 U/L (ref 10–44)
ANION GAP SERPL CALC-SCNC: 5 MMOL/L (ref 8–16)
AST SERPL-CCNC: 21 U/L (ref 10–40)
BASOPHILS # BLD AUTO: 0.06 K/UL (ref 0–0.2)
BASOPHILS NFR BLD: 1 % (ref 0–1.9)
BILIRUB SERPL-MCNC: 0.3 MG/DL (ref 0.1–1)
BUN SERPL-MCNC: 17 MG/DL (ref 6–20)
CALCIUM SERPL-MCNC: 9.2 MG/DL (ref 8.7–10.5)
CHLORIDE SERPL-SCNC: 108 MMOL/L (ref 95–110)
CHOLEST SERPL-MCNC: 178 MG/DL (ref 120–199)
CHOLEST/HDLC SERPL: 4.6 {RATIO} (ref 2–5)
CO2 SERPL-SCNC: 28 MMOL/L (ref 23–29)
CREAT SERPL-MCNC: 0.9 MG/DL (ref 0.5–1.4)
DIFFERENTIAL METHOD: ABNORMAL
EOSINOPHIL # BLD AUTO: 0.2 K/UL (ref 0–0.5)
EOSINOPHIL NFR BLD: 2.6 % (ref 0–8)
ERYTHROCYTE [DISTWIDTH] IN BLOOD BY AUTOMATED COUNT: 12.8 % (ref 11.5–14.5)
EST. GFR  (AFRICAN AMERICAN): >60 ML/MIN/1.73 M^2
EST. GFR  (NON AFRICAN AMERICAN): >60 ML/MIN/1.73 M^2
ESTIMATED AVG GLUCOSE: 100 MG/DL (ref 68–131)
GLUCOSE SERPL-MCNC: 93 MG/DL (ref 70–110)
HBA1C MFR BLD HPLC: 5.1 % (ref 4–5.6)
HCT VFR BLD AUTO: 45.1 % (ref 40–54)
HDLC SERPL-MCNC: 39 MG/DL (ref 40–75)
HDLC SERPL: 21.9 % (ref 20–50)
HGB BLD-MCNC: 13.8 G/DL (ref 14–18)
IMM GRANULOCYTES # BLD AUTO: 0.01 K/UL (ref 0–0.04)
IMM GRANULOCYTES NFR BLD AUTO: 0.2 % (ref 0–0.5)
LDLC SERPL CALC-MCNC: 125.8 MG/DL (ref 63–159)
LYMPHOCYTES # BLD AUTO: 1.9 K/UL (ref 1–4.8)
LYMPHOCYTES NFR BLD: 30 % (ref 18–48)
MCH RBC QN AUTO: 28.6 PG (ref 27–31)
MCHC RBC AUTO-ENTMCNC: 30.6 G/DL (ref 32–36)
MCV RBC AUTO: 94 FL (ref 82–98)
MONOCYTES # BLD AUTO: 0.6 K/UL (ref 0.3–1)
MONOCYTES NFR BLD: 9.3 % (ref 4–15)
NEUTROPHILS # BLD AUTO: 3.6 K/UL (ref 1.8–7.7)
NEUTROPHILS NFR BLD: 56.9 % (ref 38–73)
NONHDLC SERPL-MCNC: 139 MG/DL
NRBC BLD-RTO: 0 /100 WBC
PLATELET # BLD AUTO: 186 K/UL (ref 150–350)
PMV BLD AUTO: 11 FL (ref 9.2–12.9)
POTASSIUM SERPL-SCNC: 4.7 MMOL/L (ref 3.5–5.1)
PROT SERPL-MCNC: 6.9 G/DL (ref 6–8.4)
RBC # BLD AUTO: 4.82 M/UL (ref 4.6–6.2)
SARS-COV-2 IGG SERPLBLD QL IA.RAPID: NEGATIVE
SODIUM SERPL-SCNC: 141 MMOL/L (ref 136–145)
TRIGL SERPL-MCNC: 66 MG/DL (ref 30–150)
TSH SERPL DL<=0.005 MIU/L-ACNC: 0.75 UIU/ML (ref 0.4–4)
WBC # BLD AUTO: 6.23 K/UL (ref 3.9–12.7)

## 2020-07-31 PROCEDURE — 99999 PR PBB SHADOW E&M-EST. PATIENT-LVL IV: ICD-10-PCS | Mod: PBBFAC,,, | Performed by: OTOLARYNGOLOGY

## 2020-07-31 PROCEDURE — 36415 COLL VENOUS BLD VENIPUNCTURE: CPT

## 2020-07-31 PROCEDURE — 99205 PR OFFICE/OUTPT VISIT, NEW, LEVL V, 60-74 MIN: ICD-10-PCS | Mod: S$GLB,,, | Performed by: OTOLARYNGOLOGY

## 2020-07-31 PROCEDURE — 99205 OFFICE O/P NEW HI 60 MIN: CPT | Mod: S$GLB,,, | Performed by: OTOLARYNGOLOGY

## 2020-07-31 PROCEDURE — 85025 COMPLETE CBC W/AUTO DIFF WBC: CPT

## 2020-07-31 PROCEDURE — 83036 HEMOGLOBIN GLYCOSYLATED A1C: CPT

## 2020-07-31 PROCEDURE — 82306 VITAMIN D 25 HYDROXY: CPT

## 2020-07-31 PROCEDURE — 99999 PR PBB SHADOW E&M-EST. PATIENT-LVL IV: CPT | Mod: PBBFAC,,, | Performed by: OTOLARYNGOLOGY

## 2020-07-31 PROCEDURE — 80053 COMPREHEN METABOLIC PANEL: CPT

## 2020-07-31 PROCEDURE — 80061 LIPID PANEL: CPT

## 2020-07-31 PROCEDURE — 86769 SARS-COV-2 COVID-19 ANTIBODY: CPT

## 2020-07-31 PROCEDURE — 84443 ASSAY THYROID STIM HORMONE: CPT

## 2020-07-31 RX ORDER — FLUTICASONE PROPIONATE 50 MCG
2 SPRAY, SUSPENSION (ML) NASAL DAILY
Qty: 16 G | Refills: 2 | Status: SHIPPED | OUTPATIENT
Start: 2020-07-31 | End: 2020-10-29

## 2020-07-31 NOTE — PROGRESS NOTES
Subjective:      Ian Quiroga is a 25 y.o. male who was referred to me by Nelly Mitchell in consultation for sinusitis. He had been seen about a week ago in regard to frequent headaches and nausea which led to additional workup including a head CT given severity of headaches with recent new acute onset over the three days prior. He has no known chronic history of headache disorder or migraines. History of contact sports but no immediately preceding head trauma prior to headache onset. The CT head revealed no acute intracranial findings to suggest headache etiology or other intracranial concerns, however it did reveal paranasal sinus disease which prompted his current evaluation in ENT clinic. He was prescribed a course of Augmentin for findings of sinusitis and suspected etiology of headache which he has not started yet. Other medical issues include chronic right knee pain, anxiety, but otherwise without chronic medical conditions.     He has chronic nasal congestion with restricted nasal breathing bilaterally. Some nasal discharge but denies association of specific sinonasal symptoms with the headaches he sought care for and for which CT imaging revealing sinusitis had actually been obtained. He specifically denies discolored green/yellow nasal discharge, sinus pressure, increased nasal congestion, or other notable changes related to his nose and sinuses associated with episodic headache. However, he does have associated symptoms of visual changes and nausea which accompanies the headache. Visual changes are described as black spots in visual field. He has never been treated for migraine headache and has not taken any specific medications for this and thus not specifically identified any alleviating factors. Scheduled to see Optometry regarding the visual symptoms. Symptoms occur without identified trigger and last less than an hour.      In regard to his identified sinonasal pathology, he has not  had any previous diagnosis of sinus or nasal issues before the findings on CT and he uses no current sinonasal medications. The last course of antibiotics was a long time ago.  He does not regularly use nasal decongestant sprays. He does not recall previously having allergy testing. He denies a history of asthma. He denies a history of reflux symptoms.  He has not previously had an EGD. He denies a diagnosis of obstructive sleep apnea. He has not had sinonasal surgery.     Patient reported quality of life assessments:  Not completed    Past Medical History  He has a past medical history of Depression with anxiety and Mucous retention cyst of maxillary sinus.    Past Surgical History  He has a past surgical history that includes Hernia repair (2018).    Family History  His family history includes Asthma in his mother; Diabetes in his maternal grandmother; No Known Problems in his father, paternal grandfather, and paternal grandmother; Stroke in his maternal grandfather.    Social History  He reports that he has quit smoking. His smoking use included cigarettes. He does not have any smokeless tobacco history on file. He reports previous alcohol use. He reports current drug use. Drug: Marijuana.    Allergies  He is allergic to no known drug allergies.    Medications   He has a current medication list which includes the following prescription(s): amoxicillin-clavulanate 875-125mg and fluticasone propionate.    Review of Systems    Review of Systems   Constitutional: Negative.  Negative for chills, fatigue and fever.   HENT: Positive for congestion. Negative for dental problem, ear discharge, ear pain, facial swelling, hearing loss, hoarse voice, nosebleeds, postnasal drip, rhinorrhea, sinus pressure, sore throat, tinnitus, trouble swallowing and voice change.    Eyes: Positive for visual disturbance. Negative for photophobia, discharge and itching.   Respiratory: Negative.  Negative for apnea, cough, shortness of breath  and wheezing.    Cardiovascular: Negative.  Negative for chest pain and palpitations.   Gastrointestinal: Positive for nausea. Negative for abdominal pain and vomiting.   Endocrine: Negative.  Negative for cold intolerance and heat intolerance.   Genitourinary: Negative.  Negative for difficulty urinating.   Musculoskeletal: Positive for arthralgias (knee pain). Negative for back pain, myalgias and neck pain.   Skin: Negative.  Negative for rash and wound.   Allergic/Immunologic: Negative.  Negative for environmental allergies and food allergies.   Neurological: Positive for headaches. Negative for dizziness, seizures, syncope and light-headedness.   Hematological: Negative.  Negative for adenopathy. Does not bruise/bleed easily.   Psychiatric/Behavioral: Negative for decreased concentration, dysphoric mood and sleep disturbance.   All other systems reviewed and are negative.     Objective:     /61   Pulse 72        Constitutional:   Vital signs are normal. He appears well-developed and well-nourished. He does not appear ill. No distress. Normal speech.  No hoarse voice and breathy voice.      Head:  Normocephalic and atraumatic. No skin lesions. Salivary glands normal.  Facial strength is normal.      Ears:    Right Ear: No drainage, swelling or tenderness. No mastoid tenderness. Tympanic membrane is not injected, not perforated, not erythematous, not retracted and not bulging. No middle ear effusion.   Left Ear: No drainage, swelling or tenderness. No mastoid tenderness. Tympanic membrane is not injected, not perforated, not erythematous, not retracted and not bulging.  No middle ear effusion.     Nose:  Mucosal edema present. No rhinorrhea, sinus tenderness, septal deviation or polyps. No epistaxis. Turbinate hypertrophy.  Right sinus exhibits no maxillary sinus tenderness and no frontal sinus tenderness. Left sinus exhibits no maxillary sinus tenderness and no frontal sinus tenderness.      Mouth/Throat  Oropharynx clear and moist without lesions or asymmetry, normal uvula midline and lips, teeth, and gums normal. No oral lesions. No oropharyngeal exudate, posterior oropharyngeal edema or posterior oropharyngeal erythema.     Neck:  Neck normal without thyromegaly masses, asymmetry, normal tracheal structure, crepitus, and tenderness, trachea normal, phonation normal, full range of motion with neck supple and no adenopathy. No edema and no erythema present.     Pulmonary/Chest:   Effort normal. No respiratory distress.     Psychiatric:   He has a normal mood and affect. His speech is normal and behavior is normal.     Neurological:   He has neurological normal, alert and oriented. No cranial nerve deficit or sensory deficit. Coordination and gait normal.     Skin:   No abrasions, lacerations, lesions, or rashes.       Procedure    None   No Covid-19 testing prior to appointment.    Data Reviewed    WBC (K/uL)   Date Value   07/31/2020 6.23     Eosinophil% (%)   Date Value   07/31/2020 2.6     Eos # (K/uL)   Date Value   07/31/2020 0.2     Platelets (K/uL)   Date Value   07/31/2020 186     Glucose (mg/dL)   Date Value   07/31/2020 93     No results found for: IGE    Past medical records were reviewed with data pertinent to the chief complaint summarized in the HPI. Data was obtained from records including specifically, the documentation from [facility] on 7/24 and 7/30 which was personally reviewed and describes initial workup for presenting concern of headaches and nausea, followed by obtain CT head which prompted referral for findings of sinus opacifications.    Review of radiographic imaging:  I personally reviewed and interpreted imaging relative to the chief complaint consisting of CT Head performed on 7/29/2020.  Key findings from this imaging study include bilaterally maxillary partial opacification (right greater than left), bilateral sphenoid partial opacification (left  greater than right), and ethmoid mucosal thickening. No significant opacification of frontals. Right dewayne bullosa. Inferior turbinate hypertrophy and septal swell body hypertrophy with significant nasal valve narrowing and partial obstruction of nasal airway bilaterally. Notably, this study cuts off the most inferior portion of the maxillary sinuses and the maxillary dentition so is incomplete in regard to evaluation of maxillary sinusitis as any potential relationship to dental pathology. Representative images selected from this study and demonstrating key findings are copied below:            Radiology read:  Impression:     Unremarkable noncontrast CT head specifically without evidence for acute intracranial hemorrhage or sulcal effacement to suggest large territory recent infarction.     Patchy paranasal sinus opacities.     Clinical correlation and further evaluation as warranted.      Assessment:     1. Intractable migraine with aura with status migrainosus    2. Chronic maxillary sinusitis    3. Mucous retention cyst of maxillary sinus    4. Chronic ethmoidal sinusitis    5. Dewayne bullosa    6. Hypertrophy of both inferior nasal turbinates    7. Nasal congestion    8. Other specified disorders of nose and nasal sinuses         Plan:     I had a long discussion with the patient regarding his condition and the further workup and management options.     Chronic maxillary sinusitis, Chronic ethmoidal sinusitis  - Trial of appropriate medical management was advised as patient has had no prior trials of medical therapy for these newly diagnosed conditions.  - I have recommended medical management with a combination of nasal saline irrigations and inhaled nasal steroids (fluticasone). Regarding nasal saline irrigations, specific instructions were provided on acquiring and using this treatment. Explanation was given regarding performing these irrigations, and also provided as written instructions. Fluticasone  "nasal spray was prescribed and specific instructions also given on proper use to maximize nasal delivery of the medication. I emphasized the importance of daily use to achieve therapeutic effect, and that this medication is not intended as an occassional "as needed" treatment of symptoms. Written instructions regarding the use of fluticasone nasal spray were also provided.    - Complete course of Augmentin prescribed yesterday for treatment of sinusitis.  - fluticasone propionate (FLONASE) 50 mcg/actuation nasal spray; 2 sprays (100 mcg total) by Each Nostril route once daily.  Dispense: 16 g; Refill: 2    - Patient interested in possible surgical interventions if refractory sinonasal symptoms persist despite an adequate trial of medical therapy, and I have also advised he have specific evaluation for headache disorder and clarified the differences in his headache symptoms from those that were more likely attributable to sinonasal findings identified on CT and exam.     - Given that prior CT was performed specifically as a head CT and not for sinus evaluation, this prior study cuts off the bottom of the maxillary sinuses and prevents evaluation of possible odontogenic source of maxillary sinus disease present. I have  placed an image guidance CT order for complete evaluation of the paranasal sinuses and maxillary dentition, as well as possible intraoperative use should surgery be pursued following appropriate medical therapy.  - CT Medtronic Sinuses without; Future    Mucous retention cyst of maxillary sinus  - Though prompting referral/consult to ENT and patient primarily aware of this finding, reporting his understanding as having "a cyst in the sinus," the presence of mucous retention cyst is inconsequential and incidental and not predicted to have any significant impact on symptoms or require any specific therapy.  - Other findings as described with associated plans are anticipated to be significant, these other " findings were demonstrated to the patient on CT imaging he had preformed and all notable imaging findings were reviewed in educating him on potential associations or lack of predicted association to his symptoms, as well as associated treatment recommendations.     Suspected migraine with aura  - Discussed with patient that nausea and visual changes are much more strongly associated with migraine headache than with any sinonasal etiologies of head/facial pain. Although he does have evidence of partial nasal obstruction and sinus partial opacifications consistent with sinusitis on CT imaging, and some associated symptoms, this is unlikely to have any significant association with his episodic headache and associated visual changes and nausea, aside from possible contribution of sinusitis and nasal congestion as a trigger for these headaches.   - I described that specifically obtaining evaluation and treatment of these headaches was of importance regardless of any treatments for sinonasal pathology as I would anticipate a lack of improvement in these headache related symptoms with medical or surgical management of sinonasal disease. He voiced understanding of this and interest in proceeding with Neurology consult for headache evaluation and management which was placed today.  - Ambulatory referral/consult to Neurology; Future    Nasal congestion, Hypertrophy of both inferior nasal turbinates, Dewayne bullosa  - Trial of medical therapy as described above with nasal steroid spray and saline irrigations.  - Discussed potential role for surgical interventions if refractory to adequate medical therapy including topical nasal steroid sprays and saline irrigations. These potential sinonasal surgical interventions would include inferior turbinate reduction, dewayne bullosa resection, and septoplasty. Discussed that this would address partial obstruction and congestion primarily, but would not be directly anticipated to impact  symptoms of headache and facial pressure or pain. The likely need for ongoing medical management to treat inflammatory etiologies of symptoms despite possible surgery was also reviewed, emphasizing that surgical procedures would address physical causes of obstruction and congestion and serve as an adjunct rather than replacement of medical management.   - CT Medtronic Sinuses without; Future - order for evaluation of sinusitis, completion of maxillary sinus imaging and evaluation of maxillary dentition as possible contribution to sinus disease, nasal obstruction, and possible surgery as described above.    He voiced understanding of this discussion and instructions for management, and all of his questions were answered. I have recommended No follow-ups on file., and this has been arranged for the patient. I have encouraged him to call for any questions or concerns in the meantime.     Orders Placed This Encounter   Procedures    CT Medtronic Sinuses without    Ambulatory referral/consult to Neurology           Petr Gao MD    Rhinology, Allergy, and Sinus-Skull Base Surgery    Department of Otorhinolaryngology    [facility]    Phone  [phone]    Fax      [phone]     Additional imaging review addendum:   CT Sinuses ordered at this visit was performed on 8/3/2020. Subsequently reviewed demonstrating right periapical abscess of maxillary molar associated with right maxillary odontogenic sinusitis, which was not included in CT Head performed for prior intracranial evaluation indicated for headache workup:

## 2020-07-31 NOTE — PATIENT INSTRUCTIONS
"Information and instructions from your visit with me today:    · Start using fluticasone nasal spray:    This medication is the same as the Flonase brand nasal spray. Use this medication as instructed on the prescription, 2 sprays on each side of your nose once daily. You need to use this medication every day regardless of symptoms, as it takes time to work and get the benefits. It does not work on an "as needed" basis like taking a decongestant. Place the tip of the medication bottle in your nose and aim slightly up and out on each side to get medication high and deep into your nose and sinuses, and not have it all deposit in the very front of your nose. You can imagine aiming towards the back of your eyeball on each side for this, as opposed to straight back to the center of your nose and head.     · Start nasal irrigations with saline solution:    SALINE SINUS RINSE ([employer] Med brand): You should do a full bottle, half on one side of your nose and half on the other, 1-2 times per day (or more if able to, you cannot do this too much). Follow the instructions on the box: mix the salt packet with clean water (bottle, previously boiled, distilled, etc -- not tap water) to the line on the bottle to make the irrigation.      · Do not use nasal decongestant sprays such as Afrin or similar products.    It was nice meeting you today, and I look forward to helping you feel better soon. Please don't hesitate to call if you have any other questions or concerns, or if I can be of any assistance in the meantime.       Petr Gao    [facility]    Phone  [phone]    Fax      [phone]        Petr Gao MD  Rhinology, Sinus, and Skull Base Surgery  Department of Otorhinolaryngology        "

## 2020-07-31 NOTE — TELEPHONE ENCOUNTER
Called to schedule appt for pt. Appt scheduled for October. Pt thought this was very hilarious. Accepted appt.

## 2020-07-31 NOTE — LETTER
August 16, 2020      Nelly Mitchell, FN  [address]  [facility] [address]           [facility] - Otorhinolaryngology  [address]  [facility] [address]  Phone: [phone]  Fax: [phone]          Patient: Ian Quiroga   MR Number: [id number]   YOB: 1995   Date of Visit: 7/31/2020       Dear Nelly Mitchell:    Thank you for referring Ian Quiroga to me for evaluation. Attached you will find relevant portions of my assessment and plan of care.    If you have questions, please do not hesitate to call me. I look forward to following Ian Quiroga along with you.    Sincerely,    Petr Gao MD    Enclosure  CC:  No Recipients    If you would like to receive this communication electronically, please contact [email or web address] or [phone] to request more information on [facility] Link access.    For providers and/or their staff who would like to refer a patient to [facility], please contact us through our one-stop-shop provider referral line, [facility], at [phone].    If you feel you have received this communication in error or would no longer like to receive these types of communications, please e-mail [email or web address]

## 2020-08-03 ENCOUNTER — HOSPITAL ENCOUNTER (OUTPATIENT)
Dept: RADIOLOGY | Facility: HOSPITAL | Age: 25
Discharge: HOME | End: 2020-08-03
Attending: OTOLARYNGOLOGY
Payer: COMMERCIAL

## 2020-08-03 DIAGNOSIS — J34.89 OTHER SPECIFIED DISORDERS OF NOSE AND NASAL SINUSES: ICD-10-CM

## 2020-08-03 PROCEDURE — 70486 CT MEDTRONIC SINUSES WITHOUT: ICD-10-PCS | Mod: 26,,, | Performed by: RADIOLOGY

## 2020-08-03 PROCEDURE — 70486 CT MAXILLOFACIAL W/O DYE: CPT | Mod: 26,,, | Performed by: RADIOLOGY

## 2020-08-03 PROCEDURE — 70486 CT MAXILLOFACIAL W/O DYE: CPT | Mod: TC

## 2020-08-12 ENCOUNTER — OFFICE VISIT (OUTPATIENT)
Dept: OPTOMETRY | Facility: CLINIC | Age: 25
End: 2020-08-12
Payer: COMMERCIAL

## 2020-08-12 DIAGNOSIS — R51.9 FREQUENT HEADACHES: ICD-10-CM

## 2020-08-12 PROCEDURE — 92004 PR EYE EXAM, NEW PATIENT,COMPREHESV: ICD-10-PCS | Mod: S$GLB,,, | Performed by: OPTOMETRIST

## 2020-08-12 PROCEDURE — 92004 COMPRE OPH EXAM NEW PT 1/>: CPT | Mod: S$GLB,,, | Performed by: OPTOMETRIST

## 2020-08-12 PROCEDURE — 99999 PR PBB SHADOW E&M-EST. PATIENT-LVL III: ICD-10-PCS | Mod: PBBFAC,,, | Performed by: OPTOMETRIST

## 2020-08-12 PROCEDURE — 99999 PR PBB SHADOW E&M-EST. PATIENT-LVL III: CPT | Mod: PBBFAC,,, | Performed by: OPTOMETRIST

## 2020-08-12 NOTE — LETTER
August 12, 2020      Nelly Mitchell, FNP  [address]  [facility] [address]           [facility]  [address] [facility]  [place] [address]  Phone: [phone]  Fax: [phone]          Patient: Ian Quiroga   MR Number: [id number]   YOB: 1995   Date of Visit: 8/12/2020       Dear Nelly Mitchell:    Thank you for referring Ian Quiroga to me for evaluation. Attached you will find relevant portions of my assessment and plan of care.    If you have questions, please do not hesitate to call me. I look forward to following Ian Quiroga along with you.    Sincerely,    Jeanmarie Nj, OD    Enclosure  CC:  No Recipients    If you would like to receive this communication electronically, please contact [email or web address] or [phone] to request more information on [facility] Link access.    For providers and/or their staff who would like to refer a patient to [facility], please contact us through our one-stop-shop provider referral line, [facility], at [phone].    If you feel you have received this communication in error or would no longer like to receive these types of communications, please e-mail [email or web address]

## 2020-08-12 NOTE — PROGRESS NOTES
ANNMARIE WHITFIELD never.  Patient had noticed black spots OU that lasted about 45   minutes and a headache and nausea at the same time.  This has happened   more than 3 times in the last 3 weeks.  Patient states he did not take any   medication to relive headaches. Patient does not have a history of   migraines.  Patient takes 50 to 700 mg. Of caffeine every day.  Patient   has a history of sinus problems.  Patient is also seeing a neurologist.    Last edited by Cherelle Pacheco on 8/12/2020 11:29 AM. (History)            Assessment /Plan     For exam results, see Encounter Report.    Frequent headaches  -     Ambulatory referral/consult to Optometry      1. No ocular origin for headaches. Patient to follow up with PCP/neuro  for further testing and evaluation of headaches.

## 2020-08-28 ENCOUNTER — OFFICE VISIT (OUTPATIENT)
Dept: SPORTS MEDICINE | Facility: CLINIC | Age: 25
End: 2020-08-28
Payer: COMMERCIAL

## 2020-08-28 VITALS
SYSTOLIC BLOOD PRESSURE: 115 MMHG | HEART RATE: 84 BPM | WEIGHT: 190.69 LBS | DIASTOLIC BLOOD PRESSURE: 76 MMHG | HEIGHT: 67 IN | BODY MASS INDEX: 29.93 KG/M2

## 2020-08-28 DIAGNOSIS — M99.06 SOMATIC DYSFUNCTION OF LOWER EXTREMITY: ICD-10-CM

## 2020-08-28 DIAGNOSIS — M99.05 SOMATIC DYSFUNCTION OF PELVIC REGION: ICD-10-CM

## 2020-08-28 DIAGNOSIS — M21.42 PES PLANUS OF BOTH FEET: ICD-10-CM

## 2020-08-28 DIAGNOSIS — G89.29 CHRONIC PAIN OF RIGHT KNEE: ICD-10-CM

## 2020-08-28 DIAGNOSIS — M79.10 MYALGIA: ICD-10-CM

## 2020-08-28 DIAGNOSIS — M76.50 PATELLAR TENDINOSIS: ICD-10-CM

## 2020-08-28 DIAGNOSIS — M21.41 PES PLANUS OF BOTH FEET: ICD-10-CM

## 2020-08-28 DIAGNOSIS — M22.2X1 PATELLOFEMORAL PAIN SYNDROME OF RIGHT KNEE: Primary | ICD-10-CM

## 2020-08-28 DIAGNOSIS — M99.03 SOMATIC DYSFUNCTION OF LUMBAR REGION: ICD-10-CM

## 2020-08-28 DIAGNOSIS — M25.561 CHRONIC PAIN OF RIGHT KNEE: ICD-10-CM

## 2020-08-28 DIAGNOSIS — M99.04 SACRAL REGION SOMATIC DYSFUNCTION: ICD-10-CM

## 2020-08-28 PROCEDURE — 3008F BODY MASS INDEX DOCD: CPT | Mod: CPTII,S$GLB,, | Performed by: NEUROMUSCULOSKELETAL MEDICINE & OMM

## 2020-08-28 PROCEDURE — 99999 PR PBB SHADOW E&M-EST. PATIENT-LVL III: CPT | Mod: PBBFAC,,, | Performed by: NEUROMUSCULOSKELETAL MEDICINE & OMM

## 2020-08-28 PROCEDURE — 97110 PR THERAPEUTIC EXERCISES: ICD-10-PCS | Mod: S$GLB,,, | Performed by: NEUROMUSCULOSKELETAL MEDICINE & OMM

## 2020-08-28 PROCEDURE — 97110 THERAPEUTIC EXERCISES: CPT | Mod: S$GLB,,, | Performed by: NEUROMUSCULOSKELETAL MEDICINE & OMM

## 2020-08-28 PROCEDURE — 98926 OSTEOPATH MANJ 3-4 REGIONS: CPT | Mod: S$GLB,,, | Performed by: NEUROMUSCULOSKELETAL MEDICINE & OMM

## 2020-08-28 PROCEDURE — 99213 OFFICE O/P EST LOW 20 MIN: CPT | Mod: 25,S$GLB,, | Performed by: NEUROMUSCULOSKELETAL MEDICINE & OMM

## 2020-08-28 PROCEDURE — 98926 PR OSTEOPATHIC MANIP,3-4 BODY REGN: ICD-10-PCS | Mod: S$GLB,,, | Performed by: NEUROMUSCULOSKELETAL MEDICINE & OMM

## 2020-08-28 PROCEDURE — 3008F PR BODY MASS INDEX (BMI) DOCUMENTED: ICD-10-PCS | Mod: CPTII,S$GLB,, | Performed by: NEUROMUSCULOSKELETAL MEDICINE & OMM

## 2020-08-28 PROCEDURE — 99213 PR OFFICE/OUTPT VISIT, EST, LEVL III, 20-29 MIN: ICD-10-PCS | Mod: 25,S$GLB,, | Performed by: NEUROMUSCULOSKELETAL MEDICINE & OMM

## 2020-08-28 PROCEDURE — 99999 PR PBB SHADOW E&M-EST. PATIENT-LVL III: ICD-10-PCS | Mod: PBBFAC,,, | Performed by: NEUROMUSCULOSKELETAL MEDICINE & OMM

## 2020-08-28 RX ORDER — NAPROXEN 250 MG/1
250 TABLET ORAL 2 TIMES DAILY
COMMUNITY
End: 2023-10-03

## 2020-08-28 NOTE — PROGRESS NOTES
"  Subjective:     Ian PEREIRA Kimber     Chief Complaint   Patient presents with    Right Knee - Pain       Ian is a 25 y.o. male coming in today for right knee pain. Since last visit the pain has Improved. The pain is better with wearing knee brace and worse with not wearing brace. Pt. describes the pain as a 5/10 sharp pain that does not radiate. There has not been any new a fall/injury/ or traumas since last visit.  Pt. denies any new musculoskeletal complaints at this time.     He plans to order the shoe inserts in bulk this weekend.  He has been able to do knee extensions without pain but has not returned to any other leg work outs  He has been compliant with HEP.    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 Now just lifting weights. The pain is better with rest and worse with standing after sitting, squatting. Pt notes he can't squat with his toes straight and turns his toes out. Pt reports seeing a doctor in 2011 who "freaked out and tried to put me in an immobilizer". He was diagnosed with Osgood-Schlatter's disease.  He decided not to seek care since that time as he didn't want to stop playing sports/going to the gym.     Review of Systems   Constitutional: Negative for chills and fever.   HENT: Negative for hearing loss and tinnitus.    Eyes: Positive for blurred vision. Negative for photophobia.   Respiratory: Negative for cough and shortness of breath.    Cardiovascular: Negative for chest pain and leg swelling.   Gastrointestinal: Negative for abdominal pain, heartburn, nausea and vomiting.   Genitourinary: Negative for dysuria and hematuria.   Musculoskeletal: Positive for joint pain. Negative for back pain, falls, myalgias and neck pain.   Skin: Negative for rash.   Neurological: Positive for headaches. Negative for dizziness, tingling, focal weakness and weakness.   Endo/Heme/Allergies: Negative " for environmental allergies. Does not bruise/bleed easily.   Psychiatric/Behavioral: Negative for depression. The patient is not nervous/anxious.        PAST MEDICAL HISTORY:   Past Medical History:   Diagnosis Date    Depression with anxiety     Mucous retention cyst of maxillary sinus      PAST SURGICAL HISTORY:   Past Surgical History:   Procedure Laterality Date    HERNIA REPAIR  2018     FAMILY HISTORY:   Family History   Problem Relation Age of Onset    Asthma Mother     Diabetes Maternal Grandmother     No Known Problems Father     Stroke Maternal Grandfather     No Known Problems Paternal Grandmother     No Known Problems Paternal Grandfather      SOCIAL HISTORY:   Social History     Socioeconomic History    Marital status: Single     Spouse name: Not on file    Number of children: Not on file    Years of education: Not on file    Highest education level: Not on file   Occupational History    Occupation: IT    Social Needs    Financial resource strain: Not on file    Food insecurity     Worry: Not on file     Inability: Not on file    Transportation needs     Medical: Not on file     Non-medical: Not on file   Tobacco Use    Smoking status: Former Smoker     Types: Cigarettes   Substance and Sexual Activity    Alcohol use: Not Currently     Alcohol/week: 0.0 standard drinks    Drug use: Yes     Types: Marijuana    Sexual activity: Yes     Partners: Female   Lifestyle    Physical activity     Days per week: Not on file     Minutes per session: Not on file    Stress: Not on file   Relationships    Social connections     Talks on phone: Not on file     Gets together: Not on file     Attends [employer] service: Not on file     Active member of club or organization: Not on file     Attends meetings of clubs or organizations: Not on file     Relationship status: Not on file   Other Topics Concern    Not on file   Social History Narrative    Not on file       MEDICATIONS:   Current  "Outpatient Medications:     amoxicillin-clavulanate 875-125mg (AUGMENTIN) 875-125 mg per tablet, Take 1 tablet by mouth every 12 (twelve) hours., Disp: 14 tablet, Rfl: 0    fluticasone propionate (FLONASE) 50 mcg/actuation nasal spray, 2 sprays (100 mcg total) by Each Nostril route once daily., Disp: 16 g, Rfl: 2    naproxen (NAPROSYN) 250 MG tablet, Take 250 mg by mouth 2 (two) times daily., Disp: , Rfl:   ALLERGIES:   Review of patient's allergies indicates:   Allergen Reactions    No known drug allergies      IMAGIN. X-ray ordered due to right knee pain. (AP bilateral standing, PA bilateral standing in flexion, bilateral merchants, and  right lateral views) taken 2020.   2. X-ray images were reviewed   3. FINDINGS: No fracture, dislocation, bone destruction or OCD seen.  There is chronic calcific inferior patellar tendinosis.  No acute process seen.  4. IMPRESSION: Chronic calcific inferior patellar tendinosis    Objective:     VITAL SIGNS: /76   Pulse 84   Ht 5' 7" (1.702 m)   Wt 86.5 kg (190 lb 11.2 oz)   BMI 29.87 kg/m²    General    Nursing note and vitals reviewed.  Constitutional: He is oriented to person, place, and time. He appears well-developed and well-nourished.   HENT:   Head: Normocephalic and atraumatic.   no nasal discharge, no external ear redness or discharge   Eyes:   EOM is full and smooth  No eye redness or discharge   Neck: Neck supple. No tracheal deviation present.   Cardiovascular: Normal rate.    2+ Radial pulse bilaterally  2+ Dorsalis Pedis pulse bilaterally  No LE edema appreciated   Pulmonary/Chest: Effort normal. No respiratory distress.   Abdominal: He exhibits no distension.   No rigidity   Neurological: He is alert and oriented to person, place, and time. He exhibits normal muscle tone. Coordination normal.   See details below   Psychiatric: He has a normal mood and affect. His behavior is normal.               MUSCULOSKELETAL EXAM    right KNEE " EXAMINATION   Affected side is compared to contralateral knee     Observation:  No edema, erythema, ecchymosis, or effusion noted.  No muscle atrophy of the thighs and calves noted.  No obvious bony deformities noted.   No Genu valgus/varum noted.  No recurvatum noted.    No tibial internal/external torsion.    Posture:  Posterior pelvis tilt with loss of lumbar lordosis  Gait: Non-antalgic with Neutral ankle mechanics and Pes planus    No pain with deep squat    Tenderness:  Patella - + ingerior lateral patellar pole       Lateral joint line - none  Quad tendon - none   Medial joint line - none  Patellar tendon - none       Medial plica - none  Tibial tubercle - none   Lateral plica - none  Pes anserine - none   MCL prox - none  Distal ITB - none   MCL distal - none  MFC - none    LCL prox - none  LFC - none    LCL distal - none  Tibia - none    Fibula - none    No obvious bursae, plicae, popliteal cysts, or tendon derangement palpated.          ROM (* = with pain):   Active extension to 0° on left without hyperextension, lag, crepitus, or patellar J sign.   Active extension to 0° on right without hyperextension, lag, crepitus, or patellar J sign.  Active flexion to 135° on left and 135° on right    Strength(* = with pain):  Knee Flexion - 5/5 on left and 5/5 on right  Knee Extension - 5/5 on left and 5/5 on right  Hip Flexion - 5/5 on left and 5/5 on right  Hip Extension - 5/5 on left and 5/5 on right  Ankle dorsiflexion - 5/5 on left and 5/5 on right  Ankle Plantarflexion - 5/5 on left and 5/5 on right  glutaeus medius - 5-/5 on left and 5-/5 on right with decreased compensatory latissimus dorsi and TFL bilaterally    Patellofemoral Exam:   Patellar ballottement - negative  Bulge sign - negative  Patellar grind - negative    No patellar laxity with medial and lateral translation   No apprehension with medial and lateral patellar translation.     Meniscus Testing:     No pain with terminal extension and flexion at  joint lines.  Eloises test - negative   Bounce home test - negative    Ligament Testing:  Lachman's test - negative  No laxity with anterior drawer.  No laxity with posterior drawer.    No posterior sag sign.   Prone dial testing - negative  No laxity with varus testing at 0 and 30 degrees.  No laxity with valgus testing at 0 and 30 degrees.    IT band testing:  Geremiass test - positive bilaterally but improved with decreased IT band tightness  Noble Compression test - negative    Neurovascular Examination:   Normal gait without antalgia.  Sensation intact to light touch in the obturator, lateral/intermediate/medial/posterior femoral cutaneous, saphenous, and common peroneal nerves bilaterally.  Hamstring/gluteal firing pattern abnormal and asymmetric. Decreased compensatory muscle firing pattern to contralateral upper thoracic and parascapular musculature bilaterally but compensatory lumbar rotation on the right still present  Motor Function:    Fully intact motor function at hip, knee, foot and ankle..   Negative seated straight leg raise bilaterally.    Pulses intact at the DP and PT arteries bilaterally.    Capillary refill intact <2 seconds in all toes bilaterally.    TART (Tissue texture abnormality, Asymmetry,  Restriction of motion and/or Tenderness) changes:     Lumbar Spine   L1 Neutral   L2 Neutral   L3 Neutral   L4 FRS LEFT   L5 FRS LEFT     Pelvis:  · Innominate:Right anterior rotation  · Pubic bone:Right inferior pubic shear    Sacrum:Right on Left sacral torsion     Lower extremity: right inferior patella continual distortion (CD) fascial distortion and Trigger band (TB) fascial distortion       Key   F= Flexed   E = Extended   R = Rotated   S = Sidebent   TTA = tissue texture abnormality           Assessment:      Encounter Diagnoses   Name Primary?    Patellofemoral pain syndrome of right knee Yes    Patellar tendinosis     Chronic pain of right knee     Pes planus of both feet     Myalgia      Somatic dysfunction of lumbar region     Sacral region somatic dysfunction     Somatic dysfunction of pelvic region     Somatic dysfunction of lower extremity           Plan:       1. Chronic right knee pain secondary to patellofemoral maltracking stemming from weak gluteal muscles and poor pelvic stability with compensatory muscle firing patterns- improved with resolution of patellar tendinosis pain. Underlying pes planus contributing to biomechanical restrictions of the lower kinetic chain and increase knee strain.   - Continue ice up to 20 min at a time and over-the-counter naproxen p.o. BID prn for pain control.  - continue  right Thomas-pull light patellar brace wear with increased activity/workout  - cleared to start slow progression back to normal lifting activity as tolerated with brace wear and shoe inserts  - OTC medial arch support recommended for bilateral pes planus.   - OMT performed again today to address biomechanical contributions to maltracking and HEP reviewed  -  X-ray images of right knee taken 7/30/20 (AP bilateral standing, PA bilateral standing in flexion, bilateral merchants, and  right lateral views) showed chronic calcific inferior patellar tendinosis.     2. OMT 3-4 regions. Oral consent obtained.  Reviewed benefits and potential side effects, including bruising at site of treatment and increased soreness for the next 24-48 hours. Pt. Instructed to increased water intake by 1 L today and tomorrow to help with any residual soreness.   - OMT indicated today due to signs and symptoms as well as local and remote somatic dysfunction findings and their related neurokinetic, lymphatic, fascial and/or arteriovenous body connections.   - OMT techniques used: Myofascial Release, Muscle Energy and Fascial Distortion Model   - Treatment was tolerated well. Improvement noted in segmental mobility post-treatment in dysfunctional regions. There were no adverse events and no complications immediately  "following treatment.     3.  Reviewed with patient the following HEP:  Continue:  A)  Pelvic clock exercises given to do from the 6-12 o'clock positions:10-15 reps, twice daily. Hand out of exercise also given.   B) Prone leg extension exercise: firing glut max, then extend leg straight 5 inches then lower leg back down, then relax fired glut max. Repeat 5-10 times on each side, BID  C) Clam shell exercises bilaterally: hold leg in abducted and externally rotated position for 5-10 seconds, repeat 5-10 times  D) IT band rolling: recommend using rolling stick or foam roller to roll out IT band tension bilaterally, 1-2 times a day.      00128 HOME EXERCISE PROGRAM (HEP):  The patient was taught a homegoing physical therapy regimen as described above. The patient demonstrated understanding of the exercises and proper technique of their execution. This interaction took 15 minutes.     4. Follow-up in as needed for right knee.  Will schedule a new problem appointment for left shoulder pain on a Saturday with me or Dr. ADIN Uriarte.   Left shoulder pain while working out on 8/17/20.  Felt a "pop".  Describes superior and posterior joint line pain.  No prior history of GH dislocations.  RHD.      5. Patient agreeable to today's plan and all questions were answered    This note is dictated using the M*Modal Fluency Direct word recognition program. There are word recognition mistakes that are occasionally missed on review.            "

## 2020-09-12 ENCOUNTER — OFFICE VISIT (OUTPATIENT)
Dept: SPORTS MEDICINE | Facility: CLINIC | Age: 25
End: 2020-09-12
Payer: COMMERCIAL

## 2020-09-12 VITALS
BODY MASS INDEX: 30.06 KG/M2 | HEART RATE: 86 BPM | DIASTOLIC BLOOD PRESSURE: 74 MMHG | SYSTOLIC BLOOD PRESSURE: 131 MMHG | HEIGHT: 67 IN | WEIGHT: 191.5 LBS

## 2020-09-12 DIAGNOSIS — M25.512 CHRONIC LEFT SHOULDER PAIN: Primary | ICD-10-CM

## 2020-09-12 DIAGNOSIS — G89.29 CHRONIC LEFT SHOULDER PAIN: Primary | ICD-10-CM

## 2020-09-12 DIAGNOSIS — G25.89 SCAPULAR DYSKINESIS: ICD-10-CM

## 2020-09-12 DIAGNOSIS — M99.07 SOMATIC DYSFUNCTION OF UPPER EXTREMITY: ICD-10-CM

## 2020-09-12 PROCEDURE — 99999 PR PBB SHADOW E&M-EST. PATIENT-LVL III: CPT | Mod: PBBFAC,,, | Performed by: ORTHOPAEDIC SURGERY

## 2020-09-12 PROCEDURE — 98925 OSTEOPATH MANJ 1-2 REGIONS: CPT | Mod: S$GLB,,, | Performed by: ORTHOPAEDIC SURGERY

## 2020-09-12 PROCEDURE — 3008F BODY MASS INDEX DOCD: CPT | Mod: CPTII,S$GLB,, | Performed by: ORTHOPAEDIC SURGERY

## 2020-09-12 PROCEDURE — 97110 PR THERAPEUTIC EXERCISES: ICD-10-PCS | Mod: S$GLB,,, | Performed by: ORTHOPAEDIC SURGERY

## 2020-09-12 PROCEDURE — 99214 OFFICE O/P EST MOD 30 MIN: CPT | Mod: 25,S$GLB,, | Performed by: ORTHOPAEDIC SURGERY

## 2020-09-12 PROCEDURE — 98925 PR OSTEOPATHIC MANIP,1-2 BODY REGN: ICD-10-PCS | Mod: S$GLB,,, | Performed by: ORTHOPAEDIC SURGERY

## 2020-09-12 PROCEDURE — 99214 PR OFFICE/OUTPT VISIT, EST, LEVL IV, 30-39 MIN: ICD-10-PCS | Mod: 25,S$GLB,, | Performed by: ORTHOPAEDIC SURGERY

## 2020-09-12 PROCEDURE — 3008F PR BODY MASS INDEX (BMI) DOCUMENTED: ICD-10-PCS | Mod: CPTII,S$GLB,, | Performed by: ORTHOPAEDIC SURGERY

## 2020-09-12 PROCEDURE — 99999 PR PBB SHADOW E&M-EST. PATIENT-LVL III: ICD-10-PCS | Mod: PBBFAC,,, | Performed by: ORTHOPAEDIC SURGERY

## 2020-09-12 PROCEDURE — 97110 THERAPEUTIC EXERCISES: CPT | Mod: S$GLB,,, | Performed by: ORTHOPAEDIC SURGERY

## 2020-09-12 NOTE — PROGRESS NOTES
CC: left shoulder pain    25 y.o. Male presents today for evaluation of his left shoulder pain. Patient admits to left shoulder pain for the past several months without known mechanism of injury. He was initially evaluated 01/16/2020 by Maddison Khan PA-C who administered a GH injection. Patient recalls several months of pain relief with this, but feels as though his pain has returned. When asked where he hurts he gestures with three fingers to the posterior deltoid and also to the left scapula. He denies recent falls or trauma. 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  How long: Patient admits to left shoulder for the past several months.  What makes it better: Patient admits to improved pain at rest, with ice and following injection.   What makes it worse: Patient admits to increased pain with lifting heavy weights.   Does it radiate: Denies radiating pain.   Attempted treatments: Patient recalls history of GH injection 01/16/2020 with which he appreciated significant pain relief. He also admits to applying ice as needed. He denies taking medications. Denies history of shoulder surgery. Denies physical therapy.   Pain score: 4/10  Any mechanical symptoms: Admits to mechanical symptoms.   Feelings of instability: Denies feelings of instability.   Affecting ADLs: Denies this problem affecting his ability to perform ADLs.     REVIEW OF SYSTEMS:   Constitution: Patient denies fever, chills, night sweats, and weight changes.  Eyes: Patient denies eye pain or vision change.  HENT: Patient denies any headache, ear pain, sore throat, or nasal discharge.  CVS: Patient denies chest pain.  Lungs: Patient denies any shortness of breath or cough.  Abd: Patient denies any stomach pain, nausea, or vomiting.  Skin: Patient denies any skin rash or itching.    Hematologic/Lymphatic: Patient denies any bleeding problems, or easy bruising.   Musculoskeletal: Patient denies any recent  falls. See HPI.  Psych: Patient denies any current anxiety or nervousness.    PAST MEDICAL HISTORY:   Past Medical History:   Diagnosis Date    Depression with anxiety     Mucous retention cyst of maxillary sinus        PAST SURGICAL HISTORY:   Past Surgical History:   Procedure Laterality Date    HERNIA REPAIR  2018       FAMILY HISTORY:   Family History   Problem Relation Age of Onset    Asthma Mother     Diabetes Maternal Grandmother     No Known Problems Father     Stroke Maternal Grandfather     No Known Problems Paternal Grandmother     No Known Problems Paternal Grandfather        SOCIAL HISTORY:   Social History     Socioeconomic History    Marital status: Single     Spouse name: Not on file    Number of children: Not on file    Years of education: Not on file    Highest education level: Not on file   Occupational History    Occupation: IT    Social Needs    Financial resource strain: Not on file    Food insecurity     Worry: Not on file     Inability: Not on file    Transportation needs     Medical: Not on file     Non-medical: Not on file   Tobacco Use    Smoking status: Former Smoker     Types: Cigarettes   Substance and Sexual Activity    Alcohol use: Not Currently     Alcohol/week: 0.0 standard drinks    Drug use: Yes     Types: Marijuana    Sexual activity: Yes     Partners: Female   Lifestyle    Physical activity     Days per week: Not on file     Minutes per session: Not on file    Stress: Not on file   Relationships    Social connections     Talks on phone: Not on file     Gets together: Not on file     Attends [employer] service: Not on file     Active member of club or organization: Not on file     Attends meetings of clubs or organizations: Not on file     Relationship status: Not on file   Other Topics Concern    Not on file   Social History Narrative    Not on file       MEDICATIONS:     Current Outpatient Medications:     amoxicillin-clavulanate 875-125mg (AUGMENTIN)  "875-125 mg per tablet, Take 1 tablet by mouth every 12 (twelve) hours. (Patient not taking: Reported on 9/12/2020), Disp: 14 tablet, Rfl: 0    fluticasone propionate (FLONASE) 50 mcg/actuation nasal spray, 2 sprays (100 mcg total) by Each Nostril route once daily., Disp: 16 g, Rfl: 2    naproxen (NAPROSYN) 250 MG tablet, Take 250 mg by mouth 2 (two) times daily., Disp: , Rfl:     ALLERGIES:   Review of patient's allergies indicates:   Allergen Reactions    No known drug allergies         PHYSICAL EXAMINATION:  /74   Pulse 86   Ht 5' 7" (1.702 m)   Wt 86.9 kg (191 lb 8 oz)   BMI 29.99 kg/m²   Vitals signs and nursing note have been reviewed.  General: In no acute distress, well developed, well nourished, no diaphoresis  Eyes: EOM full and smooth, no eye redness or discharge  HENT: normocephalic and atraumatic, neck supple, trachea midline, no nasal discharge, no external ear redness or discharge  Cardiovascular: 2+ and symmetric radial bilaterally, no LE edema  Lungs: respirations non-labored, no conversational dyspnea   Abd: non-distended, no rigidity  MSK: no amputation or deformity, no swelling of extremities  Neuro: AAOx3, CN2-12 grossly intact  Skin: No rashes, warm and dry  Psychiatric: cooperative, pleasant, mood and affect appropriate for age    SHOULDER: LEFT  The affected shoulder is compared to the contralateral shoulder.    Observation:    CERVICAL SPINE  Normal head carriage. Normal thoracic kyphosis.  Full AROM in flexion, extension, sidebending, and rotation.    SHOULDER  No ecchymosis, edema, or erythema throughout the shoulder girdle.  No sternal, clavicular, or acromial deformities bilaterally.  No atrophy of the pectorals, deltoids, supraspinatus, infraspinatus, or biceps bilaterally.  No asymmetry of shoulders bilaterally.    ROM:  Active flexion to 180° on left and 180° on right.   Active abduction to 180° on left and 180° on right.    Active internal rotation to T7 on left and T7 on " right.    Active external rotation to T4 on left and T4 on right.    + scapular dyskinesia    Tenderness:  No tenderness at the SC or AC joint  No tenderness over the clavicle   No tenderness over biceps tendon or bicipital groove  No tenderness over subacromial space  + tenderness over the posterior humeral head at a fascial herniated trigger point  Myofascial restriction left upper arm    Strength Testing:  Deltoid - 5/5 on left and 5/5 on right  Biceps - 5/5 on left and 5/5 on right  Triceps - 5/5 on left and 5/5 on right  Wrist extension - 5/5 on left and 5/5 on right  Wrist flexion - 5/5 on left and 5/5 on right   - 5/5 on left and 5/5 on right  Finger extension - 5/5 on left and 5/5 on right  Finger abduction - 5/5 on left and 5/5 on right    Special Tests:  Empty can test - negative  Full can test - negative  Bear hug test - negative  Belly press test - negative  Resisted internal rotation - negative  Resisted external rotation - negative    Neer's test - negative  Hawkin's-Jaron test - negative    OSeths test - negative  Biceps load 1 test - mild discomfort at posterior shoulder  Biceps load 2 test - mild discomfort at posterior shoulder    Speed's test - negative  Yergason's test - negative    Sulcus sign - none  AP load and shift laxity - none  Anterior apprehension test - negative    Neurovascular Exam:  2+ radial pulses BL  Sensation intact to light touch in the distal median, radial, and ulnar nerve distributions bilaterally.  Spurlings test - negative  Lhermittes test - negative  Capillary refill intact <2 seconds in all digits bilaterally      IMAGIN. X-ray obtained 2020 due to left shoulder pain   2. X-ray images were reviewed personally by me and then directly with patient.  3. FINDINGS: X-ray images obtained demonstrate bones are well mineralized. Alignment is satisfactory. Joint spaces are fairly well maintained. No subluxation.  4. IMPRESSION: No significant abnormality  seen.    1. MRI obtained 01/13/2020 due to left shoulder pain   2. MRI images were reviewed personally by me and then directly with patient.  3. FINDINGS: MRI images obtained demonstrate mild tendinosis of the long head of the biceps tendon and supraspinatus.  4. IMPRESSION: As above.       ASSESSMENT:      ICD-10-CM ICD-9-CM   1. Chronic left shoulder pain  M25.512 719.41    G89.29 338.29   2. Scapular dyskinesis  G25.89 781.3   3. Somatic dysfunction of upper extremity  M99.07 739.7         PLAN:  1-2. Chronic left shoulder pain / scapular dyskinesis - new to provider    - Ian admits to left posterior deltoid and scapular pain for the past several months without known mechanism of injury. He had left shoulder GH injection 01/16/2020 which provided him with several months of pain relief.     - XRs obtained 01/16/2020 and MRI obtained 01/13/2020 and images were personally reviewed with the patient. See above for further detail.    - Based on his description of pain/body language and somatic dysfunction identified on exam, I discussed osteopathic manipulation as a treatment option today. He consents to evaluation and treatment. See below.     - HEP for scapular stabilization, Albanian, cat/camel, shoulder circles prescribed today. Handout provided, explained, and exercises were demonstrated as needed. Encouraged to do daily.  64256 HOME EXERCISE PROGRAM (HEP):  The patient was taught a homegoing physical therapy regimen as described above. The patient demonstrated understanding of the exercises and proper technique of their execution. This interaction took 15 minutes.       3. SD of UE region -     - OMT 1-2 regions. Oral consent obtained. Reviewed benefits and potential side effects. OMT indicated today due to signs and symptoms as well as local and remote somatic dysfunction findings and their related neurokinetic, lymphatic, fascial and/or arteriovenous body connections. OMT techniques used: Myofascial Release  and Fascial Distortion Model. Treatment was tolerated well. Improvement noted in segmental mobility post-treatment in dysfunctional regions. There were no adverse events and no complications immediately following treatment. Advised plenty of water to help alleviate soreness.      Future planning includes - possibly more OMT if helpful and if indicated, formal PT for scapular dysfunction. MRI arthogram if not improving or worsening    All questions were answered to the best of my ability and all concerns were addressed at this time.    Follow up in 2-3 weeks for above, or sooner if needed.    This note is dictated using the M*Modal Fluency Direct word recognition program. There are word recognition mistakes that are occasionally missed on review.

## 2021-08-15 ENCOUNTER — NURSE TRIAGE (OUTPATIENT)
Dept: ADMINISTRATIVE | Facility: CLINIC | Age: 26
End: 2021-08-15

## 2023-10-01 NOTE — PROGRESS NOTES
ANNUAL VISIT NOTE     PRESENTING HISTORY     Reason for Visit:  Annual visit.    No chief complaint on file.    History of Present Illness & ROS: Mr. Ian Quiroga is a 28 y.o. male.  Annual  Fasting for labs.   Very pleasant young man.   Recent change in work schedule. Happy about this.   Weight lifting for physical activity.   Taking in 'large amounts of caffeine'. Drinking protein drink today. Concerned about his 'ceffeine intake; feels if does not drink the caffeine, will not be able to get everything done'.   No other  complaints or concerns today.     Review of Systems:  Eyes: denies visual changes at this time denies floaters   ENT: no nasal congestion or sore throat  Respiratory: no cough or shorness of breath  Cardiovascular: no chest pain or palpitations  Gastrointestinal: no nausea or vomiting, no abdominal pain or change in bowel habits  Genitourinary: no hematuria or dysuria; denies frequency  Hematologic/Lymphatic: no easy bruising or lymphadenopathy  Musculoskeletal: no arthralgias or myalgias  Neurological: no seizures or tremors  Endocrine: no heat or cold intolerance    PAST HISTORY:     Past Medical History:   Diagnosis Date    Depression with anxiety     Mucous retention cyst of maxillary sinus        Past Surgical History:   Procedure Laterality Date    HERNIA REPAIR  2018       Family History   Problem Relation Age of Onset    Asthma Mother     Diabetes Maternal Grandmother     No Known Problems Father     Stroke Maternal Grandfather     No Known Problems Paternal Grandmother     No Known Problems Paternal Grandfather        Social History     Socioeconomic History    Marital status: Single   Occupational History    Occupation: IT    Tobacco Use    Smoking status: Former     Types: Cigarettes   Substance and Sexual Activity    Alcohol use: Not Currently     Alcohol/week: 0.0 standard drinks of alcohol    Drug use: Yes     Types: Marijuana    Sexual activity: Yes     Partners: Female        MEDICATIONS & ALLERGIES:     Current Outpatient Medications on File Prior to Visit   Medication Sig Dispense Refill    amoxicillin-clavulanate 875-125mg (AUGMENTIN) 875-125 mg per tablet Take 1 tablet by mouth every 12 (twelve) hours. (Patient not taking: Reported on 9/12/2020) 14 tablet 0    naproxen (NAPROSYN) 250 MG tablet Take 250 mg by mouth 2 (two) times daily.       No current facility-administered medications on file prior to visit.        Review of patient's allergies indicates:   Allergen Reactions    No known drug allergies        Medications Reconciliation:   I have reconciled the patient's home medications and discharge medications with the patient/family. I have updated all changes.  Refer to After-Visit Medication List.    OBJECTIVE:     Vital Signs:  There were no vitals filed for this visit.  Wt Readings from Last 3 Encounters:   09/12/20 1004 86.9 kg (191 lb 8 oz)   08/28/20 0918 86.5 kg (190 lb 11.2 oz)   07/30/20 1505 86.6 kg (191 lb)     There is no height or weight on file to calculate BMI.   Wt Readings from Last 3 Encounters:   10/03/23 90.2 kg (198 lb 13.7 oz)   09/12/20 86.9 kg (191 lb 8 oz)   08/28/20 86.5 kg (190 lb 11.2 oz)     Temp Readings from Last 3 Encounters:   03/30/12 98.2 °F (36.8 °C) (Oral)   01/05/12 98.5 °F (36.9 °C) (Oral)   12/05/11 98.4 °F (36.9 °C)     BP Readings from Last 3 Encounters:   10/03/23 112/78   09/12/20 131/74   08/28/20 115/76     Pulse Readings from Last 3 Encounters:   10/03/23 97   09/12/20 86   08/28/20 84       Physical Exam:  General: Well developed, well nourished. No distress.  HEENT: Head is normocephalic, atraumatic  Right TM: + soft cerumen   Left TM: + soft cerumen   Eyes: Clear conjunctiva.  Neck: Supple, symmetrical neck; trachea midline.  Lungs: Clear to auscultation bilaterally and normal respiratory effort.  Cardiovascular: Heart with regular rate and rhythm. No murmurs, gallops or rubs  Extremities: No LE edema. Pulses 2+ and  symmetric.   Abdomen: Abdomen is soft, non-tender non-distended with normal bowel sounds.  Skin: Skin color, texture, turgor normal. No rashes.  Musculoskeletal: Normal gait.   Neurologic: Normal strength and tone. No focal numbness or weakness.       Laboratory  Lab Results   Component Value Date    WBC 6.23 07/31/2020    HGB 13.8 (L) 07/31/2020    HCT 45.1 07/31/2020     07/31/2020    CHOL 178 07/31/2020    TRIG 66 07/31/2020    HDL 39 (L) 07/31/2020    ALT 25 07/31/2020    AST 21 07/31/2020     07/31/2020    K 4.7 07/31/2020     07/31/2020    CREATININE 0.9 07/31/2020    BUN 17 07/31/2020    CO2 28 07/31/2020    TSH 0.751 07/31/2020    HGBA1C 5.1 07/31/2020       ASSESSMENT & PLAN:     History of chronic Sinus issues  Missed to follow up with Dr NENA Gao (ENT)    History of chronic shoulder pain:   Canceled follow up with DR. Uriarte in sports med    History of Migraines:   Cancelled apt with Dr. Jacques in Neurology in 2020    Annual physical exam  -     Comprehensive Metabolic Panel; Future; Expected date: 10/03/2023  -     CBC Auto Differential; Future; Expected date: 10/03/2023  -     Lipid Panel; Future; Expected date: 10/03/2023  -     Hemoglobin A1C; Future; Expected date: 10/03/2023  -     TSH; Future; Expected date: 10/03/2023  -     Vitamin D; Future; Expected date: 10/03/2023  -     Magnesium; Future; Expected date: 10/03/2023  -     EKG 12-lead (NSR: will submit to Cardiologist for final read)    Caffeine use disorder  -     Comprehensive Metabolic Panel; Future; Expected date: 10/03/2023  -     CBC Auto Differential; Future; Expected date: 10/03/2023  -     Lipid Panel; Future; Expected date: 10/03/2023  -     Hemoglobin A1C; Future; Expected date: 10/03/2023  -     TSH; Future; Expected date: 10/03/2023  -     Vitamin D; Future; Expected date: 10/03/2023  -     Magnesium; Future; Expected date: 10/03/2023  -     EKG 12-lead  -     IN OFFICE EKG 12-LEAD (to Muse)    Impacted cerumen,  unspecified laterality  -     Ear wax removal (will RTC on Friday for Wash out with our nurse)    Future Appointments   Date Time Provider Department Center   10/6/2023  9:00 AM NURSE, [facility] INTERNAL MEDICINE [facility]     *Annual today.        Medication List            Accurate as of October 3, 2023  9:54 AM. If you have any questions, ask your nurse or doctor.                STOP taking these medications      amoxicillin-clavulanate 875-125mg 875-125 mg per tablet  Commonly known as: AUGMENTIN  Stopped by: MARCO Aceves     naproxen 250 MG tablet  Commonly known as: NAPROSYN  Stopped by: MARCO Aceves            Signing Physician:  MARCO Aceves

## 2023-10-03 ENCOUNTER — LAB VISIT (OUTPATIENT)
Dept: LAB | Facility: HOSPITAL | Age: 28
End: 2023-10-03
Payer: COMMERCIAL

## 2023-10-03 ENCOUNTER — OFFICE VISIT (OUTPATIENT)
Dept: INTERNAL MEDICINE | Facility: CLINIC | Age: 28
End: 2023-10-03
Payer: COMMERCIAL

## 2023-10-03 VITALS
HEART RATE: 97 BPM | DIASTOLIC BLOOD PRESSURE: 78 MMHG | BODY MASS INDEX: 30.14 KG/M2 | HEIGHT: 68 IN | SYSTOLIC BLOOD PRESSURE: 112 MMHG | OXYGEN SATURATION: 97 % | WEIGHT: 198.88 LBS

## 2023-10-03 DIAGNOSIS — Z00.00 ANNUAL PHYSICAL EXAM: Primary | ICD-10-CM

## 2023-10-03 DIAGNOSIS — Z00.00 ANNUAL PHYSICAL EXAM: ICD-10-CM

## 2023-10-03 DIAGNOSIS — F15.90 CAFFEINE USE DISORDER: ICD-10-CM

## 2023-10-03 DIAGNOSIS — H61.20 IMPACTED CERUMEN, UNSPECIFIED LATERALITY: ICD-10-CM

## 2023-10-03 LAB
25(OH)D3+25(OH)D2 SERPL-MCNC: 21 NG/ML (ref 30–96)
ALBUMIN SERPL BCP-MCNC: 4.3 G/DL (ref 3.5–5.2)
ALP SERPL-CCNC: 43 U/L (ref 55–135)
ALT SERPL W/O P-5'-P-CCNC: 28 U/L (ref 10–44)
ANION GAP SERPL CALC-SCNC: 6 MMOL/L (ref 8–16)
AST SERPL-CCNC: 19 U/L (ref 10–40)
BASOPHILS # BLD AUTO: 0.07 K/UL (ref 0–0.2)
BASOPHILS NFR BLD: 0.8 % (ref 0–1.9)
BILIRUB SERPL-MCNC: 0.5 MG/DL (ref 0.1–1)
BUN SERPL-MCNC: 14 MG/DL (ref 6–20)
CALCIUM SERPL-MCNC: 9.2 MG/DL (ref 8.7–10.5)
CHLORIDE SERPL-SCNC: 104 MMOL/L (ref 95–110)
CHOLEST SERPL-MCNC: 185 MG/DL (ref 120–199)
CHOLEST/HDLC SERPL: 5.1 {RATIO} (ref 2–5)
CO2 SERPL-SCNC: 27 MMOL/L (ref 23–29)
CREAT SERPL-MCNC: 1 MG/DL (ref 0.5–1.4)
DIFFERENTIAL METHOD: NORMAL
EOSINOPHIL # BLD AUTO: 0.2 K/UL (ref 0–0.5)
EOSINOPHIL NFR BLD: 1.9 % (ref 0–8)
ERYTHROCYTE [DISTWIDTH] IN BLOOD BY AUTOMATED COUNT: 12 % (ref 11.5–14.5)
EST. GFR  (NO RACE VARIABLE): >60 ML/MIN/1.73 M^2
ESTIMATED AVG GLUCOSE: 105 MG/DL (ref 68–131)
GLUCOSE SERPL-MCNC: 89 MG/DL (ref 70–110)
HBA1C MFR BLD: 5.3 % (ref 4–5.6)
HCT VFR BLD AUTO: 42.2 % (ref 40–54)
HDLC SERPL-MCNC: 36 MG/DL (ref 40–75)
HDLC SERPL: 19.5 % (ref 20–50)
HGB BLD-MCNC: 14.1 G/DL (ref 14–18)
IMM GRANULOCYTES # BLD AUTO: 0.02 K/UL (ref 0–0.04)
IMM GRANULOCYTES NFR BLD AUTO: 0.2 % (ref 0–0.5)
LDLC SERPL CALC-MCNC: 127.4 MG/DL (ref 63–159)
LYMPHOCYTES # BLD AUTO: 1.7 K/UL (ref 1–4.8)
LYMPHOCYTES NFR BLD: 18.7 % (ref 18–48)
MAGNESIUM SERPL-MCNC: 2 MG/DL (ref 1.6–2.6)
MCH RBC QN AUTO: 29.1 PG (ref 27–31)
MCHC RBC AUTO-ENTMCNC: 33.4 G/DL (ref 32–36)
MCV RBC AUTO: 87 FL (ref 82–98)
MONOCYTES # BLD AUTO: 0.8 K/UL (ref 0.3–1)
MONOCYTES NFR BLD: 8.2 % (ref 4–15)
NEUTROPHILS # BLD AUTO: 6.5 K/UL (ref 1.8–7.7)
NEUTROPHILS NFR BLD: 70.2 % (ref 38–73)
NONHDLC SERPL-MCNC: 149 MG/DL
NRBC BLD-RTO: 0 /100 WBC
PLATELET # BLD AUTO: 230 K/UL (ref 150–450)
PMV BLD AUTO: 11.7 FL (ref 9.2–12.9)
POTASSIUM SERPL-SCNC: 4.1 MMOL/L (ref 3.5–5.1)
PROT SERPL-MCNC: 7 G/DL (ref 6–8.4)
RBC # BLD AUTO: 4.84 M/UL (ref 4.6–6.2)
SODIUM SERPL-SCNC: 137 MMOL/L (ref 136–145)
TRIGL SERPL-MCNC: 108 MG/DL (ref 30–150)
TSH SERPL DL<=0.005 MIU/L-ACNC: 1.13 UIU/ML (ref 0.4–4)
WBC # BLD AUTO: 9.24 K/UL (ref 3.9–12.7)

## 2023-10-03 PROCEDURE — 1159F PR MEDICATION LIST DOCUMENTED IN MEDICAL RECORD: ICD-10-PCS | Mod: CPTII,S$GLB,, | Performed by: NURSE PRACTITIONER

## 2023-10-03 PROCEDURE — 36415 COLL VENOUS BLD VENIPUNCTURE: CPT | Performed by: NURSE PRACTITIONER

## 2023-10-03 PROCEDURE — 84443 ASSAY THYROID STIM HORMONE: CPT | Performed by: NURSE PRACTITIONER

## 2023-10-03 PROCEDURE — 99395 PR PREVENTIVE VISIT,EST,18-39: ICD-10-PCS | Mod: S$GLB,,, | Performed by: NURSE PRACTITIONER

## 2023-10-03 PROCEDURE — 83036 HEMOGLOBIN GLYCOSYLATED A1C: CPT | Performed by: NURSE PRACTITIONER

## 2023-10-03 PROCEDURE — 3008F PR BODY MASS INDEX (BMI) DOCUMENTED: ICD-10-PCS | Mod: CPTII,S$GLB,, | Performed by: NURSE PRACTITIONER

## 2023-10-03 PROCEDURE — 82306 VITAMIN D 25 HYDROXY: CPT | Performed by: NURSE PRACTITIONER

## 2023-10-03 PROCEDURE — 3078F PR MOST RECENT DIASTOLIC BLOOD PRESSURE < 80 MM HG: ICD-10-PCS | Mod: CPTII,S$GLB,, | Performed by: NURSE PRACTITIONER

## 2023-10-03 PROCEDURE — 3074F SYST BP LT 130 MM HG: CPT | Mod: CPTII,S$GLB,, | Performed by: NURSE PRACTITIONER

## 2023-10-03 PROCEDURE — 3074F PR MOST RECENT SYSTOLIC BLOOD PRESSURE < 130 MM HG: ICD-10-PCS | Mod: CPTII,S$GLB,, | Performed by: NURSE PRACTITIONER

## 2023-10-03 PROCEDURE — 93010 EKG 12-LEAD: ICD-10-PCS | Mod: S$GLB,,, | Performed by: INTERNAL MEDICINE

## 2023-10-03 PROCEDURE — 93005 EKG 12-LEAD: ICD-10-PCS | Mod: S$GLB,,, | Performed by: NURSE PRACTITIONER

## 2023-10-03 PROCEDURE — 93005 ELECTROCARDIOGRAM TRACING: CPT | Mod: S$GLB,,, | Performed by: NURSE PRACTITIONER

## 2023-10-03 PROCEDURE — 99999 PR PBB SHADOW E&M-EST. PATIENT-LVL III: ICD-10-PCS | Mod: PBBFAC,,, | Performed by: NURSE PRACTITIONER

## 2023-10-03 PROCEDURE — 1159F MED LIST DOCD IN RCRD: CPT | Mod: CPTII,S$GLB,, | Performed by: NURSE PRACTITIONER

## 2023-10-03 PROCEDURE — 1160F PR REVIEW ALL MEDS BY PRESCRIBER/CLIN PHARMACIST DOCUMENTED: ICD-10-PCS | Mod: CPTII,S$GLB,, | Performed by: NURSE PRACTITIONER

## 2023-10-03 PROCEDURE — 3008F BODY MASS INDEX DOCD: CPT | Mod: CPTII,S$GLB,, | Performed by: NURSE PRACTITIONER

## 2023-10-03 PROCEDURE — 3078F DIAST BP <80 MM HG: CPT | Mod: CPTII,S$GLB,, | Performed by: NURSE PRACTITIONER

## 2023-10-03 PROCEDURE — 80061 LIPID PANEL: CPT | Performed by: NURSE PRACTITIONER

## 2023-10-03 PROCEDURE — 99395 PREV VISIT EST AGE 18-39: CPT | Mod: S$GLB,,, | Performed by: NURSE PRACTITIONER

## 2023-10-03 PROCEDURE — 99999 PR PBB SHADOW E&M-EST. PATIENT-LVL III: CPT | Mod: PBBFAC,,, | Performed by: NURSE PRACTITIONER

## 2023-10-03 PROCEDURE — 80053 COMPREHEN METABOLIC PANEL: CPT | Performed by: NURSE PRACTITIONER

## 2023-10-03 PROCEDURE — 83735 ASSAY OF MAGNESIUM: CPT | Performed by: NURSE PRACTITIONER

## 2023-10-03 PROCEDURE — 85025 COMPLETE CBC W/AUTO DIFF WBC: CPT | Performed by: NURSE PRACTITIONER

## 2023-10-03 PROCEDURE — 1160F RVW MEDS BY RX/DR IN RCRD: CPT | Mod: CPTII,S$GLB,, | Performed by: NURSE PRACTITIONER

## 2023-10-03 PROCEDURE — 93010 ELECTROCARDIOGRAM REPORT: CPT | Mod: S$GLB,,, | Performed by: INTERNAL MEDICINE

## 2023-10-04 ENCOUNTER — TELEPHONE (OUTPATIENT)
Dept: INTERNAL MEDICINE | Facility: CLINIC | Age: 28
End: 2023-10-04
Payer: COMMERCIAL

## 2023-10-04 RX ORDER — ERGOCALCIFEROL 1.25 MG/1
50000 CAPSULE ORAL
Qty: 12 CAPSULE | Refills: 1 | Status: SHIPPED | OUTPATIENT
Start: 2023-10-04